# Patient Record
Sex: FEMALE | Race: WHITE | Employment: PART TIME | URBAN - METROPOLITAN AREA
[De-identification: names, ages, dates, MRNs, and addresses within clinical notes are randomized per-mention and may not be internally consistent; named-entity substitution may affect disease eponyms.]

---

## 2017-01-11 ENCOUNTER — HOSPITAL ENCOUNTER (OUTPATIENT)
Dept: MAMMOGRAPHY | Age: 67
Discharge: HOME | End: 2017-01-11
Attending: INTERNAL MEDICINE
Payer: MEDICAID

## 2017-01-11 DIAGNOSIS — Z85.3 HX OF BREAST CANCER: ICD-10-CM

## 2017-01-11 DIAGNOSIS — Z12.31 VISIT FOR SCREENING MAMMOGRAM: ICD-10-CM

## 2017-01-11 PROCEDURE — 77066 DX MAMMO INCL CAD BI: CPT

## 2017-05-10 ENCOUNTER — HOSPITAL ENCOUNTER (OUTPATIENT)
Dept: PHYSICAL THERAPY | Age: 67
Discharge: HOME | End: 2017-05-10
Payer: MEDICAID

## 2017-05-10 VITALS — HEIGHT: 61 IN | BODY MASS INDEX: 33.99 KG/M2 | WEIGHT: 180 LBS

## 2017-05-10 PROCEDURE — 97161 PT EVAL LOW COMPLEX 20 MIN: CPT

## 2017-05-10 PROCEDURE — 97140 MANUAL THERAPY 1/> REGIONS: CPT

## 2017-05-10 NOTE — PROGRESS NOTES
[address] [place] [address]  Physical Therapy Lymphedema Clinic  286 [facility], [address], [facility] 22.    INITIAL EVALUATION    NAME: Yudith Charles AGE: 61 y.o. GENDER: female  DATE: 5/10/2017  REFERRING PHYSICIAN: Dr. Jessica Sales:   Primary Diagnosis:  · L UE lymphedema, secondary (I89.0)  Other Treatment Diagnoses:  · Swelling not relieved by elevation (R60.9)  · Lack of coordination (R27.9)  · Malignant neoplasm of breast with lumpectomy or mastectomy (C50.919)  Date of Onset: 2012  Present Symptoms and Functional Limitations: Pt developed swelling in June 2012 and was seen in our clinic in November 2012. She has been wearing custom flat knit arm sleeves and gloves and has been getting them replaced every 6 months. She reports no change in her medical history. She wears her day garments faithfully even while at work and then at night she dons the solaris ready wrap with hand piece. She prefers the CC1 vs the CC2 as she has less tolerance for the increase pressure. She is here today to secure new garments for the next 6 months. Lymphedema Life Impact Scale: Score of 8 and impairment percentage of 12%. See scanned document. Past Medical History:   Past Medical History:   Diagnosis Date    Breast cancer ([address] 2012    Cancer ([address]     Left breast invasion ductal CA ([place])    Hypertension     S/P radiation therapy 2012     Past Surgical History:   Procedure Laterality Date    BREAST SURGERY PROCEDURE UNLISTED      Left Breast Biopsy on 09/02/11    HX BREAST LUMPECTOMY  4/26/12    LEFT breast lumpectomy with axillary node dissection     HX TUBAL LIGATION      HX VASCULAR ACCESS      portacath  X2     Current Medications:    Current Outpatient Prescriptions   Medication Sig    MULTIVITAMIN PO Take  by mouth daily.  ibuprofen (MOTRIN) 200 mg tablet Take 200 mg by mouth every six (6) hours as needed.     aspirin delayed-release (ASPIR-81) 81 mg tablet Take  by mouth daily. No current facility-administered medications for this encounter. Allergies: No Known Allergies   Prior Level of Function/Social/Work History/Personal factors and/or comorbidities impacting plan of care: Pt continues to work 20-25 hours per week as a . She will be changing to medicare when she turns 67 this year. She continues to have swelling despite being faithful with her day and night compression garments. She continues to have difficulty with the wrist area of arm sleeve. Living Situation: works part time, son lives with her and she is his       Trainable Caregiver?: son if needed   Self-care/ADLs: mod I     Mobility: I   Sleeping Arrangement:  In a bed   Adaptive Equipment Owned: none   Other: n/a  Previous Therapy: In our clinic every 6 months since fall of 2012  Compression/Lymphedema Equipment:  juzo flat knit arm sleeve and extended gloves in CC1, 2 pairs                                                                           Solaris ready wrap sleeve with gauntlet and the farrow microfine glove    SUBJECTIVE:  Pt states that she will switch to medicare come August as she will be turning 67. Patients goals for therapy: \"to get new garments for the next year. \"    OBJECTIVE DATA SUMMARY:   EXAMINATION/PRESENTATION/DECISION MAKING:   Pain:  Pain Scale 1: Numeric (0 - 10)     Pain Intensity 1: 2     Pain Location 1: Arm     Pain Orientation 1: Left, Lower     Patient Stated Pain Goal: 0       Self-Care and ADLs:  Grooming: I Bathing: showers   UB Dressing: I LB Dressing: I   Don/Doff Shoes/Socks:  I Toileting: I   Other: Pt has to limit overhead repetitive lifting at work and home    Skin and Tissue Assessment:  Dermal Status:  (x )  Intact ( )  Dry   ( )  Tenuous ( )  Flaky   ( )  Wound/lesion present ( )  Scars   ( )  Dermatitis    Texture/Consistency:  ( x)  Boggy-L hand and to distal forearm ( )  Pitting Edema ( )  Brawny ( )  Combination   ( )  Fibrotic/Woody    Pigmentation/Color Change:  ( x)  Normal except for a bruise along L medial forearm where she fall going up the steps yesterday while carrying 2 drinks ( )  Hemosiderin   ( )  Red ( )  Erythematous   ( )  Hyperpigmented ( )  Hyperlipodermatosclerosis   Anomalies:  ( )  Lymphorrhea ( )  Vesicles   ( )  Petechiae ( )  Warty Vercusis   ( )  Bullae ( )  Papilloma   Circulatory:  ( )  JESENIA ( )  Varicosities:   ( )  Pulses ( )  Vascular studies ruled out DVT in past   ( )  DVT History    Nails:  ( x)  Normal  ( )  Fungus  Stemmers Sign: positive L dorsal hand  Height:  Height: 5' 1\" (154.9 cm)  Weight:  Weight: 81.6 kg (180 lb)   BMI:  BMI (calculated): 34  (36 or greater: adversely affecting lymphedema)  Wound/Ulcer:  none      Wound Pain:   n/a  Volumetric Measurements: UEs  Right:  2,996 mL (loss of 70 ml since 11/3/16) Left:  3,128 mL (loss of 334 ml since 11/3/16)   % Difference: 4% Dominance: R   (See scanned graph)  Range of Motion: WNL  Strength:  WNL  Sensation:  Intact    Mobility:    Bed/Chair Mobility:  I Transfers: I   Sitting Balance:  good Standing Balance:  good   Gait:  I community ambulation without any assistive device Wheelchair Mobility:  n/a   Endurance:  good Stairs:  Can manage with railing       Safety:  Patient is alert and oriented:  yes   Safety awareness:  good   Fall Risk?:  Yes, yesterday she tripped over her shoes going up the steps and had 2 drinks in her hands and was unable to use the railing as she normally does and she fell, bruise noted on her L forearm but no other injury noted by patient.    Patient given written fall prevention handout: Yes   Precautions:  Standard lymphedema precautions to include avoiding blood pressure readings, injections and IVs or other procedures/acts that could lead to broken skin on affected area, and avoiding excessive heat, resistive activity or altitude without compression garment    Based on the above components, the patient evaluation is determined to be of the following complexity level: LOW     Evaluation Time: 1:30-2 pm  TREATMENT PROVIDED:   1. Treatment description:  Manual Therapy: Patient instructed in skin care principles and anatomy of the lymphatic system. Therapist able to demonstrate manual lymphatic drainage techniques for the drainage of L UE and skin care protocol. Pt reports no issues with her skin during the past 6 months. Pt reports that she has her written exercise routines and she does know how to perform each exercise. She admits to not performing them daily but can verbalize their importance in mobilizing lymph fluid. Pt has been performing self MLD using the MLD packet she received. She has been faithful wearing her day and night compression garments and is in need of replacement ones today. She was measured for both day and night garments. She was tried with the solaris ready to wear flat knit arm sleeve and glove but that are not a good fit. Pt continues to need custom garments due to her size and shape primarily of the L wrist area. Pt would benefit from a night foam hand piece as well and the vasopneumatic device in the home. Will send request for insurance benefits for these items. Treatment time:  2-2:30 pm  Minutes: 30    ASSESSMENT:   Mindi Gimenez is a 61 y.o. female who presents with secondary L UE lymphedema, stage 2 with fibrosis of the dorsal hand and forearm regions. Patient will benefit from complete decongestive therapy (CDT) including manual lymphatic drainage (MLD) technique, short-stretch textile bandages/compression system to decongest limb, and kinesiotaping as appropriate. Patient will receive instruction in proper skin care to recognize signs/symptoms of and prevent infection, therapeutic exercise, and self-MLD for independent home program and restorative lymphatic performance.     This care is medically necessary due to the infection risk with lymphedema and to improve functional activities. CDT is necessary to resolve swelling to allow patient to return to wearing normal clothes and prevent worsening of symptoms, such as infections or hospitalizations. Patient will be independent with home program strategies to allow improved ADL ability and mobility and to allow patient to return to greatest functional independence. Rehabilitation potential is considered to be good. Factors which may influence rehabilitation potential include good compliance with her compression day and night systems for the past 5 years and history of no cellulitis. Patient will benefit from 2-5 physical therapy visits over 12 weeks to optimize improvement in these areas. PLAN OF CARE:   Recommendations and Planned Interventions:  Manual lymph drainage/complete decongestive therapy  Multi-layer compression bandaging (short-stretch)  Compression garment fitting/provision  Lymphedema therapeutic exercise  AROM/PROM/Strength/Coordination  Self-care training  Functional mobility training  Education in skin care and lymphedema precautions  Self-MLD education per home program  Self-bandaging education per home program  Caregiver education as needed  Wound care as needed     GOALS  Short term goals  Time frame: to meet by 6/30/2017  1. Patient will receive 2 sets of custom day and night compression garments for her L UE and the fit will be deemed good for effective and safe use of the products. 2.  Patient will complete a demonstration of the flexitouch vasopneumatic device. 3.  Patient will continue with her home program of phase 2 CDT. Long term goals  Time frame: 8/5/2017  1. Pt will show improvement in Lymphedema Life Impact Scale by decreasing impairment percentage to under 10% and thus allow improvement in patient's quality of life. 2.  Patient will be independent with don/doff of compression system and use in order to prevent reaccumulation of fluid at discharge.   3. Pt will be independent in self-MLD and show stable limb volumes showing decongestion and pt. ready for transition to independent restorative phase of lymphedema therapy. Patient has participated in goal setting and agrees to work toward plan of care. Patient was instructed to call if questions or concerns arise. Thank you for this referral.  Kelle Bosworth, PT   Time Calculation: 60 mins    TREATMENT PLAN EFFECTIVE DATES:   5/10/2017 TO 8/5/2017  I have read the above plan of care for Rolando Cooper. I certify the above prescribed services are required by this patient and are medically necessary.   The above plan of care has been developed in conjunction with the lymphedema/physical therapist.       Physician Signature: ____________________________________Date:______________                                              Dr. Luda Srivastava

## 2017-05-15 ENCOUNTER — OFFICE VISIT (OUTPATIENT)
Dept: ONCOLOGY | Age: 67
End: 2017-05-15

## 2017-05-15 VITALS
DIASTOLIC BLOOD PRESSURE: 84 MMHG | HEIGHT: 61 IN | SYSTOLIC BLOOD PRESSURE: 174 MMHG | HEART RATE: 91 BPM | RESPIRATION RATE: 16 BRPM | WEIGHT: 185.2 LBS | OXYGEN SATURATION: 99 % | TEMPERATURE: 97.7 F | BODY MASS INDEX: 34.97 KG/M2

## 2017-05-15 DIAGNOSIS — I10 ESSENTIAL HYPERTENSION: ICD-10-CM

## 2017-05-15 DIAGNOSIS — I89.0 LYMPHEDEMA OF ARM: ICD-10-CM

## 2017-05-15 DIAGNOSIS — C50.912 BREAST CANCER, STAGE 2, LEFT (HCC): Primary | ICD-10-CM

## 2017-05-15 DIAGNOSIS — Z98.890 S/P BREAST LUMPECTOMY: ICD-10-CM

## 2017-05-15 NOTE — PROGRESS NOTES
Mili Neil is a 61 y.o. 1950 female and presents with Breast Cancer    CC breast cancer dx  9/11    STAGE:  Initially T2N1 triple neg    T1CN1a post  Chemo   Er-pr- her2 amplified    TREATMENT COURSE: neoadj TAC x 6  Then lump  Herceptin. XRT. INTERIM HISTORY: Pt is here for f/u triple neg breast cancer 12 months. mammo good 1/17. No new issues. Has medicare and wants medicaid and will ask . Brought in paperwork for us today. Saw surgery 7/16. Labs per PCP. Feels good. Past Medical History:   Diagnosis Date    [facility] 2012    Cancer [facility])     Left breast invasion ductal CA ([place])    Hypertension     S/P radiation therapy 2012     Past Surgical History:   Procedure Laterality Date    BREAST SURGERY PROCEDURE UNLISTED      Left Breast Biopsy on 09/02/11    HX BREAST LUMPECTOMY  4/26/12    LEFT breast lumpectomy with axillary node dissection     HX TUBAL LIGATION      HX VASCULAR ACCESS      portacath  X2     Social History     Social History    Marital status:      Spouse name: N/A    Number of children: N/A    Years of education: N/A     Social History Main Topics    Smoking status: Never Smoker    Smokeless tobacco: Never Used    Alcohol use Yes      Comment: rare    Drug use: Yes     Special: Prescription, OTC    Sexual activity: Not Asked     Other Topics Concern    None     Social History Narrative     Family History   Problem Relation Age of Onset    Breast Cancer Mother 48    Cancer Mother 48     breast    Heart Disease Father      MI    Diabetes Father     Diabetes Paternal Aunt     Diabetes Paternal Aunt        Current Outpatient Prescriptions   Medication Sig Dispense Refill    MULTIVITAMIN PO Take  by mouth daily.  aspirin delayed-release (ASPIR-81) 81 mg tablet Take  by mouth daily.  ibuprofen (MOTRIN) 200 mg tablet Take 200 mg by mouth every six (6) hours as needed.          No Known Allergies    Review of Systems    A comprehensive review of systems was negative except for: per HPI     Objective:  Visit Vitals    /84    Pulse 91    Temp 97.7 °F (36.5 °C) (Oral)    Resp 16    Ht 5' 1\" (1.549 m)    Wt 185 lb 3.2 oz (84 kg)    SpO2 99%    BMI 34.99 kg/m2         Physical Exam:   General appearance - alert, well appearing, and in no distress, oriented to person, place, and time and overweight  Mental status - alert, oriented to person, place, and time, normal mood, behavior, speech, dress, motor activity, and thought processes  EYE-conj clear  Mouth - mucous membranes moist, pharynx normal without lesions  Neck - supple, no cervical or supraclavicular adenopathy appreciated  Chest - clear to auscultation bilaterally  Heart - regular rate, no M/R/G  Abdomen - round, soft, non-tender. Non-distended. Bowel sounds present. Ext-left arm lymphedema in hand and forearm with wrist wrap. Edema present bilateral ankles/feet. 1+ pedal edema on the left, trace on the right (reports this is her baseline). 2+pedal pulses bilaterally. Skin-Warm and dry. Pink/scaly ~1cm lesion noted on the outer aspect of the right breast, scabbed over in the center  Neuro -alert, oriented, normal speech, no focal findings or movement disorder noted  Breast - no masses palpated bilateral breasts    Diagnostic Imaging   reviewed  [facility] Results (most recent):    Results from Hospital Encounter encounter on 01/11/17   [facility] MAMMO BI DX INCL CAD   Narrative STUDY:  Bilateral Digital diagnostic Mammogram    INDICATION: Prior left breast cancer, left segmental mastectomy and axillary  node dissection, April 2012. Direct comparison is made to prior mammograms dated January 2016, January 2015,  2014, 2013, 2011. BREAST COMPOSITION: There are scattered fibroglandular densities (approximately  25-50% glandular).     FINDINGS: Bilateral digital diagnostic mammography was performed, and is  interpreted in conjunction with a computer assisted detection (CAD) system. The  breast parenchyma is stable bilaterally. No new suspicious masses or  calcifications are identified. There is no skin thickening or nipple retraction. There has been no significant change. Impression IMPRESSION: BI-RADS 2. Benign. No mammographic evidence of malignancy. Recommend  annual bilateral mammography, unless earlier clinical indication. Findings were  discussed with the patient at the time of interpretation. Lab Results    reviewed  Lab Results   Component Value Date/Time    WBC 4.5 01/07/2016 03:37 PM    HGB 12.7 01/07/2016 03:37 PM    HCT 38.4 01/07/2016 03:37 PM    PLATELET 592 43/49/5335 03:37 PM    MCV 83 01/07/2016 03:37 PM       Lab Results   Component Value Date/Time    Sodium 144 01/07/2016 03:37 PM    Potassium 5.2 01/07/2016 03:37 PM    Chloride 101 01/07/2016 03:37 PM    CO2 27 01/07/2016 03:37 PM    Anion gap 9 06/04/2013 11:17 AM    Glucose 92 01/07/2016 03:37 PM    BUN 14 01/07/2016 03:37 PM    Creatinine 0.79 01/07/2016 03:37 PM    BUN/Creatinine ratio 18 01/07/2016 03:37 PM    GFR est AA 92 01/07/2016 03:37 PM    GFR est non-AA 80 01/07/2016 03:37 PM    Calcium 9.2 01/07/2016 03:37 PM    AST (SGOT) 17 01/07/2016 03:37 PM    Alk. phosphatase 136 01/07/2016 03:37 PM    Protein, total 7.0 01/07/2016 03:37 PM    Albumin 4.3 01/07/2016 03:37 PM    Globulin 3.6 06/04/2013 11:17 AM    A-G Ratio 1.6 01/07/2016 03:37 PM    ALT (SGPT) 8 01/07/2016 03:37 PM       .    Assessment/Plan:     Emmy Melvin is a 61 y.o. female with left Breast Cancer dx 9/11    STAGE:  Initially T2N1 triple neg    T1CN1a post  Lump. Er-pr- her2 amplified    TREATMENT COURSE: neoadj TAC x 6   Lump/XRT. herceptin. 1.  Stage 2 triple neg Breast cancer s/p lump/ XRT and s/p adjuvant chemo with TAC. Then herceptin. NERD. Pt is not on any therapy from here. Last mammogram January 2017 good. Labs per PCP. 2.  Lymphedema left arm and breast.   Chronic. She continues to use the arm wrap. 3.  HTN. Per PCP      Follow-up in 1 year, sooner if needed. ICD-10-CM ICD-9-CM    1. Breast cancer, stage 2, left C50.912 174.9    2. S/P breast lumpectomy Z98.890 V45.89    3. Essential hypertension I10 401.9    4. Lymphedema of arm I89.0 457.1      No orders of the defined types were placed in this encounter. call if any problems  There are no Patient Instructions on file for this visit. Follow-up Disposition:  Return in about 1 year (around 5/15/2018).     Allyson De La Cruz, DO

## 2017-05-15 NOTE — MR AVS SNAPSHOT
Visit Information Date & Time Provider Department Dept. Phone Encounter #  
 5/15/2017  9:30 AM Jael Vega, [place] [address] [place] [address] Oncology at [facility] [id number] Follow-up Instructions Return in about 1 year (around 5/15/2018). Routing History Follow-up and Disposition History Upcoming Health Maintenance Date Due Hepatitis C Screening 1950 Pneumococcal 19-64 Highest Risk (1 of 3 - PCV13) 8/28/1971 DTaP/Tdap/Td series (1 - Tdap) 8/28/1973 FOBT Q 1 YEAR AGE 50-75 8/28/2002 ZOSTER VACCINE AGE 60> 8/28/2012 PAP AKA CERVICAL CYTOLOGY 8/24/2014 INFLUENZA AGE 9 TO ADULT 8/1/2017 BREAST CANCER SCRN MAMMOGRAM 1/11/2019 Allergies as of 5/15/2017  Review Complete On: 5/15/2017 By: Jael Vega, DO No Known Allergies Current Immunizations  Reviewed on 5/15/2017 Name Date Influenza Vaccine Whole 10/22/2011 Reviewed by Praful Yarbrough LPN on 8/60/9479 at  9:36 AM  
You Were Diagnosed With   
  
 Codes Comments Breast cancer, stage 2, left    -  Primary ICD-10-CM: L57.661 ICD-9-CM: 174.9 S/P breast lumpectomy     ICD-10-CM: P01.642 ICD-9-CM: V45.89 Essential hypertension     ICD-10-CM: I10 
ICD-9-CM: 401.9 Lymphedema of arm     ICD-10-CM: I89.0 ICD-9-CM: 186.6 Vitals BP Pulse Temp Resp Height(growth percentile) Weight(growth percentile) 174/84 91 97.7 °F (36.5 °C) (Oral) 16 5' 1\" (1.549 m) 185 lb 3.2 oz (84 kg) SpO2 BMI OB Status Smoking Status 99% 34.99 kg/m2 Postmenopausal Never Smoker Vitals History BMI and BSA Data Body Mass Index Body Surface Area 34.99 kg/m 2 1.9 m 2 Preferred Pharmacy Pharmacy Name Phone [facility] [address] [place] [address] [phone] Your Updated Medication List  
  
   
This list is accurate as of: 5/15/17  9:58 AM.  Always use your most recent med list.  
  
  
  
  
 ASPIR-81 81 mg tablet Generic drug:  aspirin delayed-release Take  by mouth daily. ibuprofen 200 mg tablet Commonly known as:  MOTRIN Take 200 mg by mouth every six (6) hours as needed. MULTIVITAMIN PO Take  by mouth daily. Follow-up Instructions Return in about 1 year (around 5/15/2018). Introducing [facility]! [facility] introduces YourTeamOnline patient portal. Now you can access parts of your medical record, email your doctor's office, and request medication refills online. 1. In your internet browser, go to [email or web address] 2. Click on the First Time User? Click Here link in the Sign In box. You will see the New Member Sign Up page. 3. Enter your [employer] Access Code exactly as it appears below. You will not need to use this code after youve completed the sign-up process. If you do not sign up before the expiration date, you must request a new code. · YourTeam[employer] Access Code: [email or web address] Expires: 8/8/2017  1:26 PM 
 
4. Enter the last four digits of your Social Security Number (xxxx) and Date of Birth (mm/dd/yyyy) as indicated and click Submit. You will be taken to the next sign-up page. 5. Create a Your[employer] ID. This will be your Your[employer] login ID and cannot be changed, so think of one that is secure and easy to remember. 6. Create a [employer] password. You can change your password at any time. 7. Enter your Password Reset Question and Answer. This can be used at a later time if you forget your password. 8. Enter your e-mail address. You will receive e-mail notification when new information is available in [address] 9. Click Sign Up. You can now view and download portions of your medical record. 10. Click the Download Summary menu link to download a portable copy of your medical information.  
 
If you have questions, please visit the Frequently Asked Questions section of the [facility]. Remember, [employer] is NOT to be used for urgent needs. For medical emergencies, dial 911. Now available from your iPhone and Android! Please provide this summary of care documentation to your next provider. Your primary care clinician is listed as Emily Kim. If you have any questions after today's visit, please call [phone].

## 2017-06-29 NOTE — PROGRESS NOTES
Lymphedema Discharge Note  Pt called back to say that she received her custom garments and that they are fitting fine. She will need to be scheduled for a 6 month re-evaluation and be discharged from the program at this time. Pt was last seen in the clinic on 5/10/2017.

## 2017-07-13 ENCOUNTER — DOCUMENTATION ONLY (OUTPATIENT)
Dept: ONCOLOGY | Age: 67
End: 2017-07-13

## 2017-07-13 NOTE — PROGRESS NOTES
Order faxed complete to [facility] and to [facility] for scanning.

## 2017-07-21 ENCOUNTER — OFFICE VISIT (OUTPATIENT)
Dept: SURGERY | Age: 67
End: 2017-07-21

## 2017-07-21 VITALS
DIASTOLIC BLOOD PRESSURE: 66 MMHG | BODY MASS INDEX: 34.93 KG/M2 | SYSTOLIC BLOOD PRESSURE: 179 MMHG | WEIGHT: 185 LBS | HEIGHT: 61 IN | HEART RATE: 77 BPM

## 2017-07-21 DIAGNOSIS — C50.412 MALIGNANT NEOPLASM OF UPPER-OUTER QUADRANT OF LEFT FEMALE BREAST (HCC): Primary | ICD-10-CM

## 2017-07-21 NOTE — PROGRESS NOTES
HISTORY OF PRESENT ILLNESS  Cody Thakur is a 61 y.o. female. HPI ESTABLISHED patient here for annual follow up for LEFT breast cancer. Patient is doing well. Denies pain. No breast, skin, or nipple changes.       S/P neoadjuvant TACx6, LEFT lumpectomy in 4/26/12 followed by Herceptin    Mammogram done, 1/2017, BIRADS 2  ROS    Physical Exam    ASSESSMENT and PLAN  {ASSESSMENT/PLAN:[id number]}

## 2017-07-21 NOTE — PROGRESS NOTES
HISTORY OF PRESENT ILLNESS  Orin Singh is a 61 y.o. female. HPI  ESTABLISHED patient here for annual follow up for LEFT breast cancer. Patient is doing well. Denies pain. No breast, skin, or nipple changes.       S/P neoadjuvant TACx6, LEFT lumpectomy in 12 followed by Herceptin     Mammogram done, 2017, BIRADS 2    Past Medical History:   Diagnosis Date    [facility] ([facility]     Cancer ([facility]     Left breast invasion ductal CA ([facility])    Hypertension     S/P radiation therapy        Past Surgical History:   Procedure Laterality Date    BREAST SURGERY PROCEDURE UNLISTED      Left Breast Biopsy on 11    HX BREAST LUMPECTOMY  12    LEFT breast lumpectomy with axillary node dissection     HX TUBAL LIGATION      HX VASCULAR ACCESS      portacath  X2       Social History     Social History    Marital status:      Spouse name: N/A    Number of children: N/A    Years of education: N/A     Occupational History    Not on file. Social History Main Topics    Smoking status: Never Smoker    Smokeless tobacco: Never Used    Alcohol use Yes      Comment: rare    Drug use: Yes     Special: Prescription, OTC    Sexual activity: Not on file     Other Topics Concern    Not on file     Social History Narrative       Current Outpatient Prescriptions on File Prior to Visit   Medication Sig Dispense Refill    MULTIVITAMIN PO Take  by mouth daily.  ibuprofen (MOTRIN) 200 mg tablet Take 200 mg by mouth every six (6) hours as needed.  aspirin delayed-release (ASPIR-81) 81 mg tablet Take  by mouth daily. No current facility-administered medications on file prior to visit. No Known Allergies    OB History      Para Term  AB Living    2 1   1     SAB TAB Ectopic Molar Multiple Live Births    1             Obstetric Comments    Menarche:  17. LMP: 38.  # of Children:  1.   Age at Delivery of First Child:  34.   Hysterectomy/oophorectomy: NO/NO.  Breast Bx:  no.  Hx of Breast Feeding:  no. BCP:  yes. Hormone therapy:  yes. ROS  Constitutional: Negative    HENT: Negative. Eyes: Negative. Respiratory: Negative. Cardiovascular: Negative. Gastrointestinal: Negative. Genitourinary: Negative. Musculoskeletal: Negative. Skin: Negative. Neurological: Negative. Endo/Heme/Allergies: Negative. Psychiatric/Behavioral: Negative. Physical Exam   Cardiovascular: Normal rate and normal heart sounds. Pulmonary/Chest: Breath sounds normal. Right breast exhibits no inverted nipple, no mass, no nipple discharge, no skin change and no tenderness. Left breast exhibits no inverted nipple, no mass, no nipple discharge, no skin change and no tenderness. Breasts are symmetrical.       Lymphadenopathy:        Right cervical: No superficial cervical, no deep cervical and no posterior cervical adenopathy present. Left cervical: No superficial cervical, no deep cervical and no posterior cervical adenopathy present. Right axillary: No pectoral and no lateral adenopathy present. Left axillary: No pectoral and no lateral adenopathy present. ASSESSMENT and PLAN    ICD-10-CM ICD-9-CM    1. Malignant neoplasm of upper-outer quadrant of left female breast (HCC) C50.412 174.4      Pt here to f/u on LT breast cancer and doing well. MICHELLE for 5 years. Last mammo looks good. Repeat mammo and f/u prn. This plan was reviewed with the patient and patient agrees. All questions were answered.     Written by Katty Seaman, as dictated by Dr. Ralph Lau MD.

## 2017-07-21 NOTE — PATIENT INSTRUCTIONS
Mammogram: About This Test  What is it? A mammogram is an X-ray of the breast that is used to screen for breast cancer. This test can find tumors that are too small for you or your doctor to feel. Cancer is most easily treated and cured when it is found at an early stage. Why is this test done? A mammogram is done to:  · Look for breast cancer in women who don't have symptoms. · Find breast cancer in women who have symptoms. Symptoms of breast cancer may include a lump or thickening in the breast, nipple discharge, or dimpling of the skin on one area of the breast.  · Find an area of suspicious breast tissue to remove for an exam under a microscope (biopsy). How can you prepare for the test?  · Tell your doctor if you:  ¨ Are or might be pregnant. ¨ Are breastfeeding. ¨ Have breast implants. ¨ Have previously had a breast biopsy. · On the day of the test, don't use any deodorant, perfume, powders, or ointments. What happens before the test?  · You will need to take off any jewelry that might interfere with the X-ray pictures. · You will need to take off your clothes above the waist.  · You will be given a cloth or paper gown to use during the test.  What happens during the test?  · You usually stand during a mammogram.  · One at a time, your breasts will be placed on a flat plate that contains the X-ray film. · Another plate is then pressed firmly against your breast to help flatten out the breast tissue. You may be asked to lift your arm. · For a few seconds while the X-ray picture is being taken, you will need to hold your breath. · At least two pictures are taken of each breast. One is taken from the top and one from the side. What else should you know about the test?  · The X-ray plate will feel cold when you place your breast on it. Having your breasts flattened and squeezed isn't comfortable. But it is necessary to flatten out the breast tissue to get the best pictures.   · Mammograms do not prevent breast cancer or reduce a woman's risk of developing cancer. · Most things that are found during a mammogram are not breast cancer. How long does the test take? · The test will take about 10 to 15 minutes. You may be in the clinic for up to an hour. What happens after the test?  · You will probably be able to go home right away. · You can go back to your usual activities right away. Follow-up care is a key part of your treatment and safety. Be sure to make and go to all appointments, and call your doctor if you are having problems. It's also a good idea to keep a list of the medicines you take. Ask your doctor when you can expect to have your test results. Where can you learn more? Go to http://scooter-felicity.info/. Enter X000 in the search box to learn more about \"Mammogram: About This Test.\"  Current as of: July 26, 2016  Content Version: 11.3  © 9117-8485 [employer]. Care instructions adapted under license by [employer] (which disclaims liability or warranty for this information). If you have questions about a medical condition or this instruction, always ask your healthcare professional. [address] any warranty or liability for your use of this information.

## 2017-07-21 NOTE — COMMUNICATION BODY
HISTORY OF PRESENT ILLNESS  Imtiaz Parra is a 61 y.o. female. HPI  ESTABLISHED patient here for annual follow up for LEFT breast cancer. Patient is doing well. Denies pain. No breast, skin, or nipple changes.       S/P neoadjuvant TACx6, LEFT lumpectomy in 12 followed by Herceptin     Mammogram done, 2017, BIRADS 2    Past Medical History:   Diagnosis Date    [facility]     Cancer ([facility]     Left breast invasion ductal CA ([facility])    Hypertension     S/P radiation therapy        Past Surgical History:   Procedure Laterality Date    BREAST SURGERY PROCEDURE UNLISTED      Left Breast Biopsy on 11    HX BREAST LUMPECTOMY  12    LEFT breast lumpectomy with axillary node dissection     HX TUBAL LIGATION      HX VASCULAR ACCESS      portacath  X2       Social History     Social History    Marital status:      Spouse name: N/A    Number of children: N/A    Years of education: N/A     Occupational History    Not on file. Social History Main Topics    Smoking status: Never Smoker    Smokeless tobacco: Never Used    Alcohol use Yes      Comment: rare    Drug use: Yes     Special: Prescription, OTC    Sexual activity: Not on file     Other Topics Concern    Not on file     Social History Narrative       Current Outpatient Prescriptions on File Prior to Visit   Medication Sig Dispense Refill    MULTIVITAMIN PO Take  by mouth daily.  ibuprofen (MOTRIN) 200 mg tablet Take 200 mg by mouth every six (6) hours as needed.  aspirin delayed-release (ASPIR-81) 81 mg tablet Take  by mouth daily. No current facility-administered medications on file prior to visit. No Known Allergies    OB History      Para Term  AB Living    2 1   1     SAB TAB Ectopic Molar Multiple Live Births    1             Obstetric Comments    Menarche:  17. LMP: 38.  # of Children:  1.   Age at Delivery of First Child:  34.   Hysterectomy/oophorectomy: NO/NO.  Breast Bx:  no.  Hx of Breast Feeding:  no. BCP:  yes. Hormone therapy:  yes. ROS  Constitutional: Negative    HENT: Negative. Eyes: Negative. Respiratory: Negative. Cardiovascular: Negative. Gastrointestinal: Negative. Genitourinary: Negative. Musculoskeletal: Negative. Skin: Negative. Neurological: Negative. Endo/Heme/Allergies: Negative. Psychiatric/Behavioral: Negative. Physical Exam   Cardiovascular: Normal rate and normal heart sounds. Pulmonary/Chest: Breath sounds normal. Right breast exhibits no inverted nipple, no mass, no nipple discharge, no skin change and no tenderness. Left breast exhibits no inverted nipple, no mass, no nipple discharge, no skin change and no tenderness. Breasts are symmetrical.       Lymphadenopathy:        Right cervical: No superficial cervical, no deep cervical and no posterior cervical adenopathy present. Left cervical: No superficial cervical, no deep cervical and no posterior cervical adenopathy present. Right axillary: No pectoral and no lateral adenopathy present. Left axillary: No pectoral and no lateral adenopathy present. ASSESSMENT and PLAN    ICD-10-CM ICD-9-CM    1. Malignant neoplasm of upper-outer quadrant of left female breast (HCC) C50.412 174.4      Pt here to f/u on LT breast cancer and doing well. MICHELLE for 5 years. Last mammo looks good. Repeat mammo and f/u prn. This plan was reviewed with the patient and patient agrees. All questions were answered.     Written by Milad Gomez, as dictated by Dr. Mecca Cardona MD.

## 2017-07-21 NOTE — MR AVS SNAPSHOT
Visit Information Date & Time Provider Department Dept. Phone Encounter #  
 7/21/2017  8:28 AM Julia Mann MD [address] at [address] [place] [address] [id number] Upcoming Health Maintenance Date Due Hepatitis C Screening 1950 Pneumococcal 19-64 Highest Risk (1 of 3 - PCV13) 8/28/1971 DTaP/Tdap/Td series (1 - Tdap) 8/28/1973 FOBT Q 1 YEAR AGE 50-75 8/28/2002 ZOSTER VACCINE AGE 60> 6/28/2012 PAP AKA CERVICAL CYTOLOGY 8/24/2014 INFLUENZA AGE 9 TO ADULT 8/1/2017 BREAST CANCER SCRN MAMMOGRAM 1/11/2019 Allergies as of 7/21/2017  Review Complete On: 7/21/2017 By: Lawrence Gama RN No Known Allergies Current Immunizations  Reviewed on 5/15/2017 Name Date Influenza Vaccine Whole 10/22/2011 Not reviewed this visit You Were Diagnosed With   
  
 Codes Comments Malignant neoplasm of upper-outer quadrant of left female breast ([facility]    -  Primary ICD-10-CM: V46.757 ICD-9-CM: 174.4 Vitals BP Pulse Height(growth percentile) Weight(growth percentile) BMI OB Status 179/66 77 5' 1\" (1.549 m) 185 lb (83.9 kg) 34.96 kg/m2 Postmenopausal  
 Smoking Status Never Smoker Vitals History BMI and BSA Data Body Mass Index Body Surface Area 34.96 kg/m 2 1.9 m 2 Preferred Pharmacy Pharmacy Name Phone [facility] [address] [place] [address] [phone] Your Updated Medication List  
  
   
This list is accurate as of: 7/21/17  8:52 AM.  Always use your most recent med list.  
  
  
  
  
 ASPIR-81 81 mg tablet Generic drug:  aspirin delayed-release Take  by mouth daily. ibuprofen 200 mg tablet Commonly known as:  MOTRIN Take 200 mg by mouth every six (6) hours as needed. MULTIVITAMIN PO Take  by mouth daily. Patient Instructions Mammogram: About This Test 
What is it? A mammogram is an X-ray of the breast that is used to screen for breast cancer. This test can find tumors that are too small for you or your doctor to feel. Cancer is most easily treated and cured when it is found at an early stage. Why is this test done? A mammogram is done to: 
· Look for breast cancer in women who don't have symptoms. · Find breast cancer in women who have symptoms. Symptoms of breast cancer may include a lump or thickening in the breast, nipple discharge, or dimpling of the skin on one area of the breast. 
· Find an area of suspicious breast tissue to remove for an exam under a microscope (biopsy). How can you prepare for the test? 
· Tell your doctor if you: ¨ Are or might be pregnant. ¨ Are breastfeeding. ¨ Have breast implants. ¨ Have previously had a breast biopsy. · On the day of the test, don't use any deodorant, perfume, powders, or ointments. What happens before the test? 
· You will need to take off any jewelry that might interfere with the X-ray pictures. · You will need to take off your clothes above the waist. 
· You will be given a cloth or paper gown to use during the test. 
What happens during the test? 
· You usually stand during a mammogram. 
· One at a time, your breasts will be placed on a flat plate that contains the X-ray film. · Another plate is then pressed firmly against your breast to help flatten out the breast tissue. You may be asked to lift your arm. · For a few seconds while the X-ray picture is being taken, you will need to hold your breath. · At least two pictures are taken of each breast. One is taken from the top and one from the side. What else should you know about the test? 
· The X-ray plate will feel cold when you place your breast on it. Having your breasts flattened and squeezed isn't comfortable. But it is necessary to flatten out the breast tissue to get the best pictures. · Mammograms do not prevent breast cancer or reduce a woman's risk of developing cancer. · Most things that are found during a mammogram are not breast cancer. How long does the test take? · The test will take about 10 to 15 minutes. You may be in the clinic for up to an hour. What happens after the test? 
· You will probably be able to go home right away. · You can go back to your usual activities right away. Follow-up care is a key part of your treatment and safety. Be sure to make and go to all appointments, and call your doctor if you are having problems. It's also a good idea to keep a list of the medicines you take. Ask your doctor when you can expect to have your test results. Where can you learn more? Go to http://scooter-felicity.info/. Enter J319 in the search box to learn more about \"Mammogram: About This Test.\" Current as of: July 26, 2016 Content Version: 11.3 © 3169-4878 [employer]. Care instructions adapted under license by [employer] (which disclaims liability or warranty for this information). If you have questions about a medical condition or this instruction, always ask your healthcare professional. [address] any warranty or liability for your use of this information. Introducing [facility]! Clair Lynch introduces [employer] Environment patient portal. Now you can access parts of your medical record, email your doctor's office, and request medication refills online. 1. In your internet browser, go to [email or web address] 2. Click on the First Time User? Click Here link in the Sign In box. You will see the New Member Sign Up page. 3. Enter your [employer] Access Code exactly as it appears below. You will not need to use this code after youve completed the sign-up process. If you do not sign up before the expiration date, you must request a new code.  
 
· [employer] Access Code: [email or web address] 
 Expires: 8/8/2017  1:26 PM 
 
4. Enter the last four digits of your Social Security Number (xxxx) and Date of Birth (mm/dd/yyyy) as indicated and click Submit. You will be taken to the next sign-up page. 5. Create a [employer] ID. This will be your [employer] login ID and cannot be changed, so think of one that is secure and easy to remember. 6. Create a [employer] password. You can change your password at any time. 7. Enter your Password Reset Question and Answer. This can be used at a later time if you forget your password. 8. Enter your e-mail address. You will receive e-mail notification when new information is available in [address] 9. Click Sign Up. You can now view and download portions of your medical record. 10. Click the Download Summary menu link to download a portable copy of your medical information. If you have questions, please visit the Frequently Asked Questions section of the [employer] website. Remember, [employer] is NOT to be used for urgent needs. For medical emergencies, dial 911. Now available from your iPhone and Android! Please provide this summary of care documentation to your next provider. Your primary care clinician is listed as Emily Kim. If you have any questions after today's visit, please call [phone].

## 2018-02-08 ENCOUNTER — HOSPITAL ENCOUNTER (OUTPATIENT)
Dept: MAMMOGRAPHY | Age: 68
Discharge: HOME | End: 2018-02-08
Attending: INTERNAL MEDICINE
Payer: MEDICARE

## 2018-02-08 DIAGNOSIS — Z12.39 SCREENING BREAST EXAMINATION: ICD-10-CM

## 2018-02-08 DIAGNOSIS — Z85.3 PERSONAL HISTORY OF MALIGNANT NEOPLASM OF BREAST: ICD-10-CM

## 2018-02-08 PROCEDURE — 77066 DX MAMMO INCL CAD BI: CPT

## 2018-05-02 ENCOUNTER — OFFICE VISIT (OUTPATIENT)
Dept: INTERNAL MEDICINE CLINIC | Age: 68
End: 2018-05-02

## 2018-05-02 VITALS
DIASTOLIC BLOOD PRESSURE: 93 MMHG | SYSTOLIC BLOOD PRESSURE: 184 MMHG | BODY MASS INDEX: 34.48 KG/M2 | RESPIRATION RATE: 18 BRPM | OXYGEN SATURATION: 98 % | TEMPERATURE: 97.9 F | HEIGHT: 61 IN | WEIGHT: 182.6 LBS | HEART RATE: 84 BPM

## 2018-05-02 DIAGNOSIS — C50.912 MALIGNANT NEOPLASM OF LEFT BREAST, STAGE 2 (HCC): ICD-10-CM

## 2018-05-02 DIAGNOSIS — Z13.21 ENCOUNTER FOR VITAMIN DEFICIENCY SCREENING: ICD-10-CM

## 2018-05-02 DIAGNOSIS — Z11.59 ENCOUNTER FOR HEPATITIS C SCREENING TEST FOR LOW RISK PATIENT: ICD-10-CM

## 2018-05-02 DIAGNOSIS — I10 ESSENTIAL HYPERTENSION: Primary | ICD-10-CM

## 2018-05-02 DIAGNOSIS — I89.0 LYMPHEDEMA: ICD-10-CM

## 2018-05-02 RX ORDER — KETOROLAC TROMETHAMINE 5 MG/ML
SOLUTION OPHTHALMIC
COMMUNITY
Start: 2018-04-22 | End: 2019-07-24 | Stop reason: ALTCHOICE

## 2018-05-02 RX ORDER — LISINOPRIL AND HYDROCHLOROTHIAZIDE 10; 12.5 MG/1; MG/1
1 TABLET ORAL DAILY
Qty: 30 TAB | Refills: 1 | Status: SHIPPED | OUTPATIENT
Start: 2018-05-02 | End: 2018-06-14 | Stop reason: SDUPTHER

## 2018-05-02 RX ORDER — OFLOXACIN 3 MG/ML
SOLUTION/ DROPS OPHTHALMIC
COMMUNITY
Start: 2018-04-22 | End: 2019-07-24 | Stop reason: ALTCHOICE

## 2018-05-02 RX ORDER — PREDNISOLONE ACETATE 10 MG/ML
SUSPENSION/ DROPS OPHTHALMIC
COMMUNITY
Start: 2018-04-22 | End: 2019-07-24 | Stop reason: ALTCHOICE

## 2018-05-02 NOTE — PATIENT INSTRUCTIONS
Acute High Blood Pressure: Care Instructions  Your Care Instructions    Acute high blood pressure is very high blood pressure. It's a serious problem. Very high blood pressure can damage your brain, heart, eyes, and kidneys. You may have been given medicines to lower your blood pressure. You may have gotten them as pills or through a needle in one of your veins. This is called an IV. And maybe you were given other medicines too. These can be needed when high blood pressure causes other problems. To keep your blood pressure at a lower level, you may need to make healthy lifestyle changes. And you will probably need to take medicines. Be sure to follow up with your doctor about your blood pressure and what you can do about it. Follow-up care is a key part of your treatment and safety. Be sure to make and go to all appointments, and call your doctor if you are having problems. It's also a good idea to know your test results and keep a list of the medicines you take. How can you care for yourself at home? · See your doctor as often as he or she recommends. This is to make sure your blood pressure is under control. You will probably go at least 2 times a year. But it may be more often at first.  · Take your blood pressure medicine exactly as prescribed. You may take one or more types. They include diuretics, beta-blockers, ACE inhibitors, calcium channel blockers, and angiotensin II receptor blockers. Call your doctor if you think you are having a problem with your medicine. · If you take blood pressure medicine, talk to your doctor before you take decongestants or anti-inflammatory medicine, such as ibuprofen. These can raise blood pressure. · Learn how to check your blood pressure at home. Check it often. · Ask your doctor if it's okay to drink alcohol. · Talk to your doctor about lifestyle changes that can help blood pressure. These include being active and not smoking.   When should you call for help?  Call 911 anytime you think you may need emergency care. This may mean having symptoms that suggest that your blood pressure is causing a serious heart or blood vessel problem. Your blood pressure may be over 180/110. ? For example, call 911 if:  ? · You have symptoms of a heart attack. These may include:  ¨ Chest pain or pressure, or a strange feeling in the chest.  ¨ Sweating. ¨ Shortness of breath. ¨ Nausea or vomiting. ¨ Pain, pressure, or a strange feeling in the back, neck, jaw, or upper belly or in one or both shoulders or arms. ¨ Lightheadedness or sudden weakness. ¨ A fast or irregular heartbeat. ? · You have symptoms of a stroke. These may include:  ¨ Sudden numbness, tingling, weakness, or loss of movement in your face, arm, or leg, especially on only one side of your body. ¨ Sudden vision changes. ¨ Sudden trouble speaking. ¨ Sudden confusion or trouble understanding simple statements. ¨ Sudden problems with walking or balance. ¨ A sudden, severe headache that is different from past headaches. ? · You have severe back or belly pain. ?Do not wait until your blood pressure comes down on its own. Get help right away. ?Call your doctor now or seek immediate care if:  ? · Your blood pressure is much higher than normal (such as 180/110 or higher), but you don't have symptoms. ? · You think high blood pressure is causing symptoms, such as:  ¨ Severe headache. ¨ Blurry vision. ? Watch closely for changes in your health, and be sure to contact your doctor if:  ? · Your blood pressure measures 140/90 or higher at least 2 times. That means the top number is 140 or higher or the bottom number is 90 or higher, or both. ? · You think you may be having side effects from your blood pressure medicine. ? · Your blood pressure is usually normal, but it goes above normal at least 2 times. Where can you learn more? Go to http://scooter-felicity.info/.   Enter P815 in the search box to learn more about \"Acute High Blood Pressure: Care Instructions. \"  Current as of: September 21, 2016  Content Version: 11.4  © 1590-7734 [employer]. Care instructions adapted under license by [employer] (which disclaims liability or warranty for this information). If you have questions about a medical condition or this instruction, always ask your healthcare professional. [address] any warranty or liability for your use of this information. DASH Diet: Care Instructions  Your Care Instructions    The DASH diet is an eating plan that can help lower your blood pressure. DASH stands for Dietary Approaches to Stop Hypertension. Hypertension is high blood pressure. The DASH diet focuses on eating foods that are high in calcium, potassium, and magnesium. These nutrients can lower blood pressure. The foods that are highest in these nutrients are fruits, vegetables, low-fat dairy products, nuts, seeds, and legumes. But taking calcium, potassium, and magnesium supplements instead of eating foods that are high in those nutrients does not have the same effect. The DASH diet also includes whole grains, fish, and poultry. The DASH diet is one of several lifestyle changes your doctor may recommend to lower your high blood pressure. Your doctor may also want you to decrease the amount of sodium in your diet. Lowering sodium while following the DASH diet can lower blood pressure even further than just the DASH diet alone. Follow-up care is a key part of your treatment and safety. Be sure to make and go to all appointments, and call your doctor if you are having problems. It's also a good idea to know your test results and keep a list of the medicines you take. How can you care for yourself at home? Following the DASH diet  · Eat 4 to 5 servings of fruit each day.  A serving is 1 medium-sized piece of fruit, ½ cup chopped or canned fruit, 1/4 cup dried fruit, or 4 ounces (½ cup) of fruit juice. Choose fruit more often than fruit juice. · Eat 4 to 5 servings of vegetables each day. A serving is 1 cup of lettuce or raw leafy vegetables, ½ cup of chopped or cooked vegetables, or 4 ounces (½ cup) of vegetable juice. Choose vegetables more often than vegetable juice. · Get 2 to 3 servings of low-fat and fat-free dairy each day. A serving is 8 ounces of milk, 1 cup of yogurt, or 1 ½ ounces of cheese. · Eat 6 to 8 servings of grains each day. A serving is 1 slice of bread, 1 ounce of dry cereal, or ½ cup of cooked rice, pasta, or cooked cereal. Try to choose whole-grain products as much as possible. · Limit lean meat, poultry, and fish to 2 servings each day. A serving is 3 ounces, about the size of a deck of cards. · Eat 4 to 5 servings of nuts, seeds, and legumes (cooked dried beans, lentils, and split peas) each week. A serving is 1/3 cup of nuts, 2 tablespoons of seeds, or ½ cup of cooked beans or peas. · Limit fats and oils to 2 to 3 servings each day. A serving is 1 teaspoon of vegetable oil or 2 tablespoons of salad dressing. · Limit sweets and added sugars to 5 servings or less a week. A serving is 1 tablespoon jelly or jam, ½ cup sorbet, or 1 cup of lemonade. · Eat less than 2,300 milligrams (mg) of sodium a day. If you limit your sodium to 1,500 mg a day, you can lower your blood pressure even more. Tips for success  · Start small. Do not try to make dramatic changes to your diet all at once. You might feel that you are missing out on your favorite foods and then be more likely to not follow the plan. Make small changes, and stick with them. Once those changes become habit, add a few more changes. · Try some of the following:  ¨ Make it a goal to eat a fruit or vegetable at every meal and at snacks. This will make it easy to get the recommended amount of fruits and vegetables each day.   ¨ Try yogurt topped with fruit and nuts for a snack or healthy dessert. ¨ Add lettuce, tomato, cucumber, and onion to sandwiches. ¨ Combine a ready-made pizza crust with low-fat mozzarella cheese and lots of vegetable toppings. Try using tomatoes, squash, spinach, broccoli, carrots, cauliflower, and onions. ¨ Have a variety of cut-up vegetables with a low-fat dip as an appetizer instead of chips and dip. ¨ Sprinkle sunflower seeds or chopped almonds over salads. Or try adding chopped walnuts or almonds to cooked vegetables. ¨ Try some vegetarian meals using beans and peas. Add garbanzo or kidney beans to salads. Make burritos and tacos with mashed chaidez beans or black beans. Where can you learn more? Go to http://scooterZebitfelicity.info/. Enter V471 in the search box to learn more about \"DASH Diet: Care Instructions. \"  Current as of: September 21, 2016  Content Version: 11.4  © 7846-9222 [employer]. Care instructions adapted under license by [employer] (which disclaims liability or warranty for this information). If you have questions about a medical condition or this instruction, always ask your healthcare professional. [address] any warranty or liability for your use of this information. High Blood Pressure: Care Instructions  Your Care Instructions    If your blood pressure is usually above 140/90, you have high blood pressure, or hypertension. That means the top number is 140 or higher or the bottom number is 90 or higher, or both. Despite what a lot of people think, high blood pressure usually doesn't cause headaches or make you feel dizzy or lightheaded. It usually has no symptoms. But it does increase your risk for heart attack, stroke, and kidney or eye damage. The higher your blood pressure, the more your risk increases. Your doctor will give you a goal for your blood pressure. Your goal will be based on your health and your age.  An example of a goal is to keep your blood pressure below 140/90. Lifestyle changes, such as eating healthy and being active, are always important to help lower blood pressure. You might also take medicine to reach your blood pressure goal.  Follow-up care is a key part of your treatment and safety. Be sure to make and go to all appointments, and call your doctor if you are having problems. It's also a good idea to know your test results and keep a list of the medicines you take. How can you care for yourself at home? Medical treatment  · If you stop taking your medicine, your blood pressure will go back up. You may take one or more types of medicine to lower your blood pressure. Be safe with medicines. Take your medicine exactly as prescribed. Call your doctor if you think you are having a problem with your medicine. · Talk to your doctor before you start taking aspirin every day. Aspirin can help certain people lower their risk of a heart attack or stroke. But taking aspirin isn't right for everyone, because it can cause serious bleeding. · See your doctor regularly. You may need to see the doctor more often at first or until your blood pressure comes down. · If you are taking blood pressure medicine, talk to your doctor before you take decongestants or anti-inflammatory medicine, such as ibuprofen. Some of these medicines can raise blood pressure. · Learn how to check your blood pressure at home. Lifestyle changes  · Stay at a healthy weight. This is especially important if you put on weight around the waist. Losing even 10 pounds can help you lower your blood pressure. · If your doctor recommends it, get more exercise. Walking is a good choice. Bit by bit, increase the amount you walk every day. Try for at least 30 minutes on most days of the week. You also may want to swim, bike, or do other activities. · Avoid or limit alcohol. Talk to your doctor about whether you can drink any alcohol.   · Try to limit how much sodium you eat to less than 2,300 milligrams (mg) a day. Your doctor may ask you to try to eat less than 1,500 mg a day. · Eat plenty of fruits (such as bananas and oranges), vegetables, legumes, whole grains, and low-fat dairy products. · Lower the amount of saturated fat in your diet. Saturated fat is found in animal products such as milk, cheese, and meat. Limiting these foods may help you lose weight and also lower your risk for heart disease. · Do not smoke. Smoking increases your risk for heart attack and stroke. If you need help quitting, talk to your doctor about stop-smoking programs and medicines. These can increase your chances of quitting for good. When should you call for help? Call 911 anytime you think you may need emergency care. This may mean having symptoms that suggest that your blood pressure is causing a serious heart or blood vessel problem. Your blood pressure may be over 180/110. ? For example, call 911 if:  ? · You have symptoms of a heart attack. These may include:  ¨ Chest pain or pressure, or a strange feeling in the chest.  ¨ Sweating. ¨ Shortness of breath. ¨ Nausea or vomiting. ¨ Pain, pressure, or a strange feeling in the back, neck, jaw, or upper belly or in one or both shoulders or arms. ¨ Lightheadedness or sudden weakness. ¨ A fast or irregular heartbeat. ? · You have symptoms of a stroke. These may include:  ¨ Sudden numbness, tingling, weakness, or loss of movement in your face, arm, or leg, especially on only one side of your body. ¨ Sudden vision changes. ¨ Sudden trouble speaking. ¨ Sudden confusion or trouble understanding simple statements. ¨ Sudden problems with walking or balance. ¨ A sudden, severe headache that is different from past headaches. ? · You have severe back or belly pain. ?Do not wait until your blood pressure comes down on its own. Get help right away.   ?Call your doctor now or seek immediate care if:  ? · Your blood pressure is much higher than normal (such as 180/110 or higher), but you don't have symptoms. ? · You think high blood pressure is causing symptoms, such as:  ¨ Severe headache. ¨ Blurry vision. ? Watch closely for changes in your health, and be sure to contact your doctor if:  ? · Your blood pressure measures 140/90 or higher at least 2 times. That means the top number is 140 or higher or the bottom number is 90 or higher, or both. ? · You think you may be having side effects from your blood pressure medicine. ? · Your blood pressure is usually normal, but it goes above normal at least 2 times. Where can you learn more? Go to http://scooter-felicity.info/. Enter N917 in the search box to learn more about \"High Blood Pressure: Care Instructions. \"  Current as of: September 21, 2016  Content Version: 11.4  © 7137-0946 [employer]. Care instructions adapted under license by [employer] (which disclaims liability or warranty for this information). If you have questions about a medical condition or this instruction, always ask your healthcare professional. [place] [address] any warranty or liability for your use of this information.

## 2018-05-02 NOTE — LETTER
5/3/2018 1:03 PM 
 
Ms. Orvil Peabody [address] [facility] [place] [id number] Dear Orvil Peabody: 
 
Please find your most recent results below. Resulted Orders CBC W/O DIFF Result Value Ref Range WBC 5.2 3.4 - 10.8 x10E3/uL  
 RBC 4.53 3.77 - 5.28 x10E6/uL HGB 12.3 11.1 - 15.9 g/dL HCT 37.9 34.0 - 46.6 % MCV 84 79 - 97 fL  
 MCH 27.2 26.6 - 33.0 pg  
 MCHC 32.5 31.5 - 35.7 g/dL  
 RDW 14.3 12.3 - 15.4 % PLATELET 862 981 - 754 x10E3/uL Narrative Performed at:  [address]  [id number] : Kathy Joya MD, Phone:  [phone] METABOLIC PANEL, COMPREHENSIVE Result Value Ref Range Glucose 92 65 - 99 mg/dL BUN 11 8 - 27 mg/dL Creatinine 0.69 0.57 - 1.00 mg/dL GFR est non-AA 92 >59 mL/min/1.73 GFR est  >59 mL/min/1.73  
 BUN/Creatinine ratio 16 12 - 28 Sodium 144 134 - 144 mmol/L Potassium 4.0 3.5 - 5.2 mmol/L Chloride 102 96 - 106 mmol/L  
 CO2 30 (H) 18 - 29 mmol/L Calcium 8.9 8.7 - 10.3 mg/dL Protein, total 6.8 6.0 - 8.5 g/dL Albumin 4.2 3.6 - 4.8 g/dL GLOBULIN, TOTAL 2.6 1.5 - 4.5 g/dL A-G Ratio 1.6 1.2 - 2.2 Bilirubin, total 0.7 0.0 - 1.2 mg/dL Alk. phosphatase 117 39 - 117 IU/L  
 AST (SGOT) 14 0 - 40 IU/L  
 ALT (SGPT) 7 0 - 32 IU/L Narrative Performed at:  [address]  [id number] : Kathy Joya MD, Phone:  [phone] LIPID PANEL Result Value Ref Range Cholesterol, total 185 100 - 199 mg/dL Triglyceride 74 0 - 149 mg/dL HDL Cholesterol 54 >39 mg/dL VLDL, calculated 15 5 - 40 mg/dL LDL, calculated 116 (H) 0 - 99 mg/dL Narrative Performed at:  [address]  [id number] : Kathy Joya MD, Phone:  [phone] TSH 3RD GENERATION Result Value Ref Range TSH 5.050 (H) 0.450 - 4.500 uIU/mL Narrative Performed at:  [address]  [id number] : Beto Jones MD, Phone:  [phone] VITAMIN D, 25 HYDROXY Result Value Ref Range VITAMIN D, 25-HYDROXY 15.1 (L) 30.0 - 100.0 ng/mL Comment:  
   Vitamin D deficiency has been defined by the [address] [place] [address] practice guideline as a 
level of serum 25-OH vitamin D less than 20 ng/mL (1,2). The Endocrine Society went on to further define vitamin D 
insufficiency as a level between 21 and 29 ng/mL (2). 1. IOM ([place] of Medicine). 2010. Dietary reference 
   intakes for calcium and D. [address] [place] [address]: The 
   PT [employer]. 2. Anny MF, Wilma WINTERS, Antonella DACOSTA, et al. 
   Evaluation, treatment, and prevention of vitamin D 
   deficiency: an Endocrine Society clinical practice 
   guideline. JCEM. 2011 Jul; 96(7):1911-30. Narrative Performed at:  [address]  [id number] : Beto Jones MD, Phone:  [phone] HEPATITIS C AB Result Value Ref Range Hep C Virus Ab <0.1 0.0 - 0.9 s/co ratio Comment:  
                                     Negative:     < 0.8 Indeterminate: 0.8 - 0.9 Positive:     > 0.9 The CDC recommends that a positive HCV antibody result 
 be followed up with a HCV Nucleic Acid Amplification 
 test (840110). Narrative Performed at:  [address]  [id number] : Beto Jones MD, Phone:  [phone] CVD REPORT Result Value Ref Range INTERPRETATION Note Comment:  
   Supplemental report is available. Narrative Performed at:  [address] [place] [address]  [id number] : Karen Kelly MD, Phone:  [phone] RECOMMENDATIONS: 
 
 1.  LDL slightly elevated.  Recommend that patient watch diet for fatty foods and exercise as tolerated. 2.  TSH is high.    Need to start Synthroid 75 mcg PO daily.  Take 30 min before breakfast.  Need to repeat TSH in 4-6 weeks. 3.  Vitamin D low.  Vitamin D 50,000 units weekly x 12 weeks.  After completion of 12 weeks, recommend OTC Vitamin D3 1000 units daily. 4. Other labs stable. Please call me if you have any questions: [phone] Sincerely, Jordyn Ardon NP

## 2018-05-02 NOTE — MR AVS SNAPSHOT
[address] [facility] 102 1400 [address] [place] [address] 
[phone] Patient: Gisela Madrigal MRN: [id number] ZPU:[birth year] Visit Information Date & Time Provider Department Dept. Phone Encounter #  
 5/2/2018 10:00 AM Crystal Lay, [address] [facility] Internal Medicine [phone] [id number] Upcoming Health Maintenance Date Due Hepatitis C Screening 1950 DTaP/Tdap/Td series (1 - Tdap) 8/28/1973 FOBT Q 1 YEAR AGE 50-75 8/28/2002 ZOSTER VACCINE AGE 60> 6/28/2012 GLAUCOMA SCREENING Q2Y 8/28/2017 Bone Densitometry (Dexa) Screening 8/28/2017 Pneumococcal 65+ High/Highest Risk (1 of 2 - PCV13) 8/28/2017 MEDICARE YEARLY EXAM 3/28/2018 Influenza Age 5 to Adult 8/1/2018 BREAST CANCER SCRN MAMMOGRAM 2/8/2020 Allergies as of 5/2/2018  Review Complete On: 5/2/2018 By: Crystal Lay NP No Known Allergies Current Immunizations  Reviewed on 5/15/2017 Name Date Influenza Vaccine Whole 10/22/2011 Not reviewed this visit You Were Diagnosed With   
  
 Codes Comments Essential hypertension    -  Primary ICD-10-CM: I10 
ICD-9-CM: 401.9 Malignant neoplasm of left breast, stage 2 ([facility] [address]     ICD-10-CM: R10.330 ICD-9-CM: 174.9 Encounter for vitamin deficiency screening     ICD-10-CM: Z13.21 ICD-9-CM: V77.99 Encounter for hepatitis C screening test for low risk patient     ICD-10-CM: Z11.59 
ICD-9-CM: V73.89 Vitals BP Pulse Temp Resp Height(growth percentile) Weight(growth percentile) (!) 184/93 (BP 1 Location: Right arm, BP Patient Position: Sitting) 84 97.9 °F (36.6 °C) (Oral) 18 5' 1\" (1.549 m) 182 lb 9.6 oz (82.8 kg) SpO2 BMI OB Status Smoking Status 98% 34.5 kg/m2 Postmenopausal Never Smoker Vitals History BMI and BSA Data Body Mass Index Body Surface Area 34.5 kg/m 2 1.89 m 2 Preferred Pharmacy Pharmacy Name Phone [address] [facility] [phone] Your Updated Medication List  
  
   
This list is accurate as of 5/2/18 10:12 AM.  Always use your most recent med list.  
  
  
  
  
 ASPIR-81 81 mg tablet Generic drug:  aspirin delayed-release Take  by mouth daily. ibuprofen 200 mg tablet Commonly known as:  MOTRIN Take 200 mg by mouth every six (6) hours as needed. ketorolac 0.5 % ophthalmic solution Commonly known as:  ACULAR  
  
 lisinopril-hydroCHLOROthiazide 10-12.5 mg per tablet Commonly known as:  Gweneth Rasher Take 1 Tab by mouth daily. MULTIVITAMIN PO Take  by mouth daily. ofloxacin 0.3 % ophthalmic solution Commonly known as:  FLOXIN  
  
 prednisoLONE acetate 1 % ophthalmic suspension Commonly known as:  PRED FORTE Prescriptions Sent to Pharmacy Refills  
 lisinopril-hydroCHLOROthiazide (PRINZIDE, ZESTORETIC) 10-12.5 mg per tablet 1 Sig: Take 1 Tab by mouth daily. Class: Normal  
 Pharmacy: [facility] [address] [facility] Ph #: [phone] Route: Oral  
  
We Performed the Following CBC W/O DIFF [71985 CPT(R)] HEPATITIS C AB [50759 CPT(R)] LIPID PANEL [85553 CPT(R)] METABOLIC PANEL, COMPREHENSIVE [63673 CPT(R)] TSH 3RD GENERATION [26360 CPT(R)] VITAMIN D, 25 HYDROXY [id number] CPT(R)] Introducing [facility]! Aicha De introduces [employer] patient portal. Now you can access parts of your medical record, email your doctor's office, and request medication refills online. 1. In your internet browser, go to [email or web address] More 2. Click on the First Time User? Click Here link in the Sign In box. You will see the New Member Sign Up page. 3. Enter your [employer] Access Code exactly as it appears below. You will not need to use this code after youve completed the sign-up process.  If you do not sign up before the expiration date, you must request a new code. · RUN Access Code: [email or web address] Expires: 5/28/2018  6:09 PM 
 
4. Enter the last four digits of your Social Security Number (xxxx) and Date of Birth (mm/dd/yyyy) as indicated and click Submit. You will be taken to the next sign-up page. 5. Create a RUN ID. This will be your RUN login ID and cannot be changed, so think of one that is secure and easy to remember. 6. Create a RUN password. You can change your password at any time. 7. Enter your Password Reset Question and Answer. This can be used at a later time if you forget your password. 8. Enter your e-mail address. You will receive e-mail notification when new information is available in [address] 9. Click Sign Up. You can now view and download portions of your medical record. 10. Click the Download Summary menu link to download a portable copy of your medical information. If you have questions, please visit the Frequently Asked Questions section of the RUN website. Remember, RUN is NOT to be used for urgent needs. For medical emergencies, dial 911. Now available from your iPhone and Android! Please provide this summary of care documentation to your next provider. Your primary care clinician is listed as Ashley Nolen. If you have any questions after today's visit, please call [phone].

## 2018-05-02 NOTE — PROGRESS NOTES
Health Maintenance Due   Topic Date Due    Hepatitis C Screening  1950    DTaP/Tdap/Td series (1 - Tdap) 08/28/1973    FOBT Q 1 YEAR AGE 50-75  08/28/2002    ZOSTER VACCINE AGE 60>  06/28/2012    GLAUCOMA SCREENING Q2Y  08/28/2017    Bone Densitometry (Dexa) Screening  08/28/2017    Pneumococcal 65+ High/Highest Risk (1 of 2 - PCV13) 08/28/2017    MEDICARE YEARLY EXAM  03/28/2018       Chief Complaint   Patient presents with   Baudilio Vaca Pre-op Exam     Cateract Surgery     New Patient       1. Have you been to the ER, urgent care clinic since your last visit? Hospitalized since your last visit? No    2. Have you seen or consulted any other health care providers outside of the [address] [place] [address] since your last visit? Include any pap smears or colon screening. No    3) Do you have an Advance Directive on file? no    4) Are you interested in receiving information on Advance Directives? NO      Patient is accompanied by self I have received verbal consent from Renee Rivera to discuss any/all medical information while they are present in the room.

## 2018-05-02 NOTE — PROGRESS NOTES
Subjective:     Chief Complaint   Patient presents with    Pre-op Exam     Cateract Surgery     New Patient       History of Present Illness    Deisy Rocha is a 74y.o. year old female who presents as a new patient to [facility] care. She is due to have cataract surgery later this month. She has pre-op paperwork to be completed. Her PMH includes breast cancer, HTN, lymphedema, arthritis, and vitamin d deficiency. She denies any new complaints today. She states she was on BP meds 8 years ago but stopped taking them due to insurance issues. She is hypertensive today. She denies any cardiac related complaints. She has a hx of breast cancer. She is followed by Dr. Berta Marvin, oncology. She underwent a lumpectomy and radiation. She also has a hx of lymphedema to her left arm. She wears a lymphedema sleeve. She was previously seen at the lymphedema clinic. She states she no longer goes because it is not covered by medicare. She denies any other new complaints today. NAD. No CP, SOB, GI, or  symptoms. Reviewed medications, recent lab work and imaging with patient. Pt reports compliance with medications. Current Outpatient Prescriptions on File Prior to Visit   Medication Sig Dispense Refill    MULTIVITAMIN PO Take  by mouth daily.  ibuprofen (MOTRIN) 200 mg tablet Take 200 mg by mouth every six (6) hours as needed.  aspirin delayed-release (ASPIR-81) 81 mg tablet Take  by mouth daily. No current facility-administered medications on file prior to visit.         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[place])    Hypertension     S/P radiation therapy 2012    also chemo      Past Surgical History:   Procedure Laterality Date    BREAST SURGERY PROCEDURE UNLISTED      Left Breast Biopsy on 09/02/11    HX BREAST LUMPECTOMY  4/26/12    LEFT breast lumpectomy with axillary node dissection     HX TUBAL LIGATION      HX VASCULAR ACCESS      portacath  X2      Social History   Substance Use Topics    Smoking status: Never Smoker    Smokeless tobacco: Never Used    Alcohol use Yes      Comment: rare      Family History   Problem Relation Age of Onset   [address] [facility] Breast Cancer Mother 48    Cancer Mother 48     breast    Heart Disease Father      MI    Diabetes Father     Diabetes Paternal Aunt     Diabetes Paternal Aunt         Objective:     Vitals:    05/02/18 0956   BP: (!) 184/93   Pulse: 84   Resp: 18   Temp: 97.9 °F (36.6 °C)   TempSrc: Oral   SpO2: 98%   Weight: 182 lb 9.6 oz (82.8 kg)   Height: 5' 1\" (1.549 m)       Review of Systems   Constitutional: Negative. HENT: Negative. Eyes: Negative. Respiratory: Negative. Cardiovascular: Negative. Gastrointestinal: Negative. Genitourinary: Negative. Musculoskeletal: Negative. Skin: Negative. Neurological: Negative. Psychiatric/Behavioral: Negative. Physical Exam   Constitutional: She is oriented to person, place, and time. She appears well-developed and well-nourished. No distress. Well-appearing  female. NAD   HENT:   Head: Normocephalic and atraumatic. Eyes: Conjunctivae and EOM are normal. Pupils are equal, round, and reactive to light. Neck: Normal range of motion. Neck supple. Cardiovascular: Normal rate, regular rhythm and normal heart sounds. Pulmonary/Chest: Effort normal and breath sounds normal. No respiratory distress. She has no wheezes. Abdominal: Soft. Bowel sounds are normal. She exhibits no distension. There is no tenderness. Musculoskeletal: Normal range of motion. She exhibits edema. She exhibits no tenderness or deformity. 1+ BLE edema. Left arm edema r/t lymphedema. Wearing lymphedema sleeve. Neurological: She is alert and oriented to person, place, and time. Skin: Skin is warm and dry. No rash noted. She is not diaphoretic. No erythema.  No pallor. Psychiatric: She has a normal mood and affect. Her behavior is normal.   Nursing note and vitals reviewed. Assessment/ Plan:   Diagnoses and all orders for this visit:    1. Essential hypertension   Will order  -     lisinopril-hydroCHLOROthiazide (PRINZIDE, ZESTORETIC) 10-12.5 mg per tablet; Take 1 Tab by mouth daily.  -     CBC W/O DIFF  -     METABOLIC PANEL, COMPREHENSIVE  -     LIPID PANEL  -     TSH 3RD GENERATION    2. Malignant neoplasm of left breast, stage 2 (HCC)   Will order  -     CBC W/O DIFF  -     METABOLIC PANEL, COMPREHENSIVE  -     LIPID PANEL  -     TSH 3RD GENERATION    3. Lymphedema   Will order  -     CBC W/O DIFF  -     METABOLIC PANEL, COMPREHENSIVE    4. Encounter for vitamin deficiency screening   Will order  -     VITAMIN D, 25 HYDROXY    5. Encounter for hepatitis C screening test for low risk patient   Will order  -     HEPATITIS C AB    Patient's plan of care has been reviewed with them. Patient and/or family have verbally conveyed their understanding and agreement of the patient's signs, symptoms, diagnosis, treatment and prognosis and additionally agree to follow up as recommended or return to [facility] should their condition change prior to follow-up. Discharge instructions have also been provided to the patient with some educational information regarding their diagnosis as well a list of reasons why they would want to return to the office prior to their follow-up appointment should their condition change. Follow-up in 1 month.

## 2018-05-03 LAB
25(OH)D3+25(OH)D2 SERPL-MCNC: 15.1 NG/ML (ref 30–100)
ALBUMIN SERPL-MCNC: 4.2 G/DL (ref 3.6–4.8)
ALBUMIN/GLOB SERPL: 1.6 {RATIO} (ref 1.2–2.2)
ALP SERPL-CCNC: 117 IU/L (ref 39–117)
ALT SERPL-CCNC: 7 IU/L (ref 0–32)
AST SERPL-CCNC: 14 IU/L (ref 0–40)
BILIRUB SERPL-MCNC: 0.7 MG/DL (ref 0–1.2)
BUN SERPL-MCNC: 11 MG/DL (ref 8–27)
BUN/CREAT SERPL: 16 (ref 12–28)
CALCIUM SERPL-MCNC: 8.9 MG/DL (ref 8.7–10.3)
CHLORIDE SERPL-SCNC: 102 MMOL/L (ref 96–106)
CHOLEST SERPL-MCNC: 185 MG/DL (ref 100–199)
CO2 SERPL-SCNC: 30 MMOL/L (ref 18–29)
CREAT SERPL-MCNC: 0.69 MG/DL (ref 0.57–1)
ERYTHROCYTE [DISTWIDTH] IN BLOOD BY AUTOMATED COUNT: 14.3 % (ref 12.3–15.4)
GFR SERPLBLD CREATININE-BSD FMLA CKD-EPI: 106 ML/MIN/1.73
GFR SERPLBLD CREATININE-BSD FMLA CKD-EPI: 92 ML/MIN/1.73
GLOBULIN SER CALC-MCNC: 2.6 G/DL (ref 1.5–4.5)
GLUCOSE SERPL-MCNC: 92 MG/DL (ref 65–99)
HCT VFR BLD AUTO: 37.9 % (ref 34–46.6)
HCV AB S/CO SERPL IA: <0.1 S/CO RATIO (ref 0–0.9)
HDLC SERPL-MCNC: 54 MG/DL
HGB BLD-MCNC: 12.3 G/DL (ref 11.1–15.9)
INTERPRETATION, 910389: NORMAL
LDLC SERPL CALC-MCNC: 116 MG/DL (ref 0–99)
MCH RBC QN AUTO: 27.2 PG (ref 26.6–33)
MCHC RBC AUTO-ENTMCNC: 32.5 G/DL (ref 31.5–35.7)
MCV RBC AUTO: 84 FL (ref 79–97)
PLATELET # BLD AUTO: 260 X10E3/UL (ref 150–379)
POTASSIUM SERPL-SCNC: 4 MMOL/L (ref 3.5–5.2)
PROT SERPL-MCNC: 6.8 G/DL (ref 6–8.5)
RBC # BLD AUTO: 4.53 X10E6/UL (ref 3.77–5.28)
SODIUM SERPL-SCNC: 144 MMOL/L (ref 134–144)
TRIGL SERPL-MCNC: 74 MG/DL (ref 0–149)
TSH SERPL DL<=0.005 MIU/L-ACNC: 5.05 UIU/ML (ref 0.45–4.5)
VLDLC SERPL CALC-MCNC: 15 MG/DL (ref 5–40)
WBC # BLD AUTO: 5.2 X10E3/UL (ref 3.4–10.8)

## 2018-05-03 RX ORDER — ERGOCALCIFEROL 1.25 MG/1
50000 CAPSULE ORAL
Qty: 12 CAP | Refills: 0 | Status: SHIPPED | OUTPATIENT
Start: 2018-05-03 | End: 2019-07-24 | Stop reason: ALTCHOICE

## 2018-05-03 RX ORDER — LEVOTHYROXINE SODIUM 75 UG/1
75 TABLET ORAL
Qty: 30 TAB | Refills: 1 | Status: SHIPPED | OUTPATIENT
Start: 2018-05-03 | End: 2018-07-06 | Stop reason: SDUPTHER

## 2018-05-03 NOTE — PROGRESS NOTES
1.  LDL slightly elevated. Recommend that patient watch diet for fatty foods and exercise as tolerated. 2.  TSH is high. Any hx of hypothyroid? Need to start Synthroid 75 mcg PO daily. Take 30 min before breakfast.  Need to repeat TSH in 4-6 weeks. 3.  Vitamin D low. Vitamin D 50,000 units weekly x 12 weeks. After completion of 12 weeks, recommend OTC Vitamin D3 1000 units daily. Other labs stable.

## 2018-06-05 ENCOUNTER — OFFICE VISIT (OUTPATIENT)
Dept: INTERNAL MEDICINE CLINIC | Age: 68
End: 2018-06-05

## 2018-06-05 VITALS
SYSTOLIC BLOOD PRESSURE: 131 MMHG | OXYGEN SATURATION: 97 % | HEIGHT: 61 IN | WEIGHT: 177.2 LBS | HEART RATE: 69 BPM | BODY MASS INDEX: 33.46 KG/M2 | DIASTOLIC BLOOD PRESSURE: 47 MMHG | RESPIRATION RATE: 18 BRPM | TEMPERATURE: 97 F

## 2018-06-05 DIAGNOSIS — I89.0 LYMPHEDEMA OF ARM: ICD-10-CM

## 2018-06-05 DIAGNOSIS — E03.9 ACQUIRED HYPOTHYROIDISM: ICD-10-CM

## 2018-06-05 DIAGNOSIS — Z00.00 MEDICARE ANNUAL WELLNESS VISIT, INITIAL: ICD-10-CM

## 2018-06-05 DIAGNOSIS — Z12.11 SCREENING FOR COLORECTAL CANCER: ICD-10-CM

## 2018-06-05 DIAGNOSIS — Z23 ENCOUNTER FOR IMMUNIZATION: ICD-10-CM

## 2018-06-05 DIAGNOSIS — Z12.12 SCREENING FOR COLORECTAL CANCER: ICD-10-CM

## 2018-06-05 DIAGNOSIS — Z71.89 ACP (ADVANCE CARE PLANNING): ICD-10-CM

## 2018-06-05 DIAGNOSIS — C50.912 MALIGNANT NEOPLASM OF LEFT BREAST, STAGE 2 (HCC): ICD-10-CM

## 2018-06-05 DIAGNOSIS — I10 ESSENTIAL HYPERTENSION: Primary | ICD-10-CM

## 2018-06-05 NOTE — PROGRESS NOTES
Deisy Rocha is a 74 y.o. female and presents for annual Medicare Wellness Visit. Problem List: Reviewed with patient and discussed risk factors.     Patient Active Problem List   Diagnosis Code    HTN (hypertension) I10    Breast cancer, stage 2 (HCC) C50.919    Lymphedema of arm I89.0    Arthritis M19.90    Right shoulder pain M25.511    S/P breast lumpectomy Z98.890    Vitamin D deficiency E55.9    Insomnia G47.00    Skin lesion of breast N64.9    Advanced care planning/counseling discussion Z71.89    Acquired hypothyroidism E03.9       Current medical providers:  Patient Care Team:  Jagdish Lynch NP as PCP - General (Nurse Practitioner)  Kim Child MD as Physician (Radiation Oncology)  Jackson Gonsalves DO as Physician (Oncology)  Jeff Adler MD as Physician (Breast Surgery)  Ed Bravo NP (Nurse Practitioner)  Jeff Adler MD (Breast Surgery)  Jeff Adler MD (Breast Surgery)  Jeff Adler MD (Breast Surgery)  ANNABELLE Wyatt LPN    PSH: Reviewed with patient  Past Surgical History:   Procedure Laterality Date    BREAST SURGERY PROCEDURE UNLISTED      Left Breast Biopsy on 09/02/11    HX BREAST LUMPECTOMY  4/26/12    LEFT breast lumpectomy with axillary node dissection     HX TUBAL LIGATION      HX VASCULAR ACCESS      portacath  X2        SH: Reviewed with patient  Social History   Substance Use Topics    Smoking status: Never Smoker    Smokeless tobacco: Never Used    Alcohol use Yes      Comment: rare       FH: Reviewed with patient  Family History   Problem Relation Age of Onset    Breast Cancer Mother 48    Cancer Mother 48     breast    Heart Disease Father      MI    Diabetes Father     Diabetes Paternal Aunt     Diabetes Paternal Aunt        Medications/Allergies: Reviewed with patient  Current Outpatient Prescriptions on File Prior to Visit   Medication Sig Dispense Refill    ergocalciferol (ERGOCALCIFEROL) 50,000 unit capsule Take 1 Cap by mouth every seven (7) days. 12 Cap 0    levothyroxine (SYNTHROID) 75 mcg tablet Take 1 Tab by mouth Daily (before breakfast). 30 Tab 1    ketorolac (ACULAR) 0.5 % ophthalmic solution       ofloxacin (FLOXIN) 0.3 % ophthalmic solution       prednisoLONE acetate (PRED FORTE) 1 % ophthalmic suspension       lisinopril-hydroCHLOROthiazide (PRINZIDE, ZESTORETIC) 10-12.5 mg per tablet Take 1 Tab by mouth daily. 30 Tab 1    MULTIVITAMIN PO Take  by mouth daily.  ibuprofen (MOTRIN) 200 mg tablet Take 200 mg by mouth every six (6) hours as needed.  aspirin delayed-release (ASPIR-81) 81 mg tablet Take  by mouth daily. No current facility-administered medications on file prior to visit. No Known Allergies    Objective:  Visit Vitals    /47 (BP 1 Location: Right arm, BP Patient Position: Sitting)    Pulse 69    Temp 97 °F (36.1 °C) (Oral)    Resp 18    Ht 5' 1\" (1.549 m)    Wt 177 lb 3.2 oz (80.4 kg)    SpO2 97%    BMI 33.48 kg/m2    Body mass index is 33.48 kg/(m^2).     Assessment of cognitive impairment: Alert and oriented x 3    Depression Screen:   PHQ over the last two weeks 6/5/2018   Little interest or pleasure in doing things More than half the days   Feeling down, depressed or hopeless More than half the days   Total Score PHQ 2 4   Trouble falling or staying asleep, or sleeping too much More than half the days   Feeling tired or having little energy More than half the days   Poor appetite or overeating Not at all   Feeling bad about yourself - or that you are a failure or have let yourself or your family down Not at all   Trouble concentrating on things such as school, work, reading or watching TV Not at all   Moving or speaking so slowly that other people could have noticed; or the opposite being so fidgety that others notice Not at all   Thoughts of being better off dead, or hurting yourself in some way Not at all   PHQ 9 Score 8   How difficult have these problems made it for you to do your work, take care of your home and get along with others Not difficult at all       Fall Risk Assessment:    Fall Risk Assessment, last 12 mths 6/5/2018   Able to walk? Yes   Fall in past 12 months? No       Functional Ability:   Does the patient exhibit a steady gait? yes   How long did it take the patient to get up and walk from a sitting position? <5 seconds   Is the patient self reliant?  (ie can do own laundry, meals, household chores)  yes     Does the patient handle his/her own medications? yes     Does the patient handle his/her own money? yes     Is the patients home safe (ie good lighting, handrails on stairs and bath, etc.)? yes     Did you notice or did patient express any hearing difficulties? no     Did you notice or did patient express any vision difficulties? no            Advance Care Planning:   Patient was offered the opportunity to discuss advance care planning:  yes     Does patient have an Advance Directive:  no          Plan:      Orders Placed This Encounter    PNEUMOCOCCAL CONJ VACCINE 13 VALENT IM    COLOGUARD TEST (FECAL DNA COLORECTAL CANCER SCREENING)       Health Maintenance   Topic Date Due    DTaP/Tdap/Td series (1 - Tdap) 08/28/1973    Cologuard Q 3 Years  08/28/2002    ZOSTER VACCINE AGE 60>  06/28/2012    GLAUCOMA SCREENING Q2Y  08/28/2017    Bone Densitometry (Dexa) Screening  08/28/2017    Pneumococcal 65+ High/Highest Risk (1 of 2 - PCV13) 08/28/2017    Influenza Age 9 to Adult  08/01/2018    MEDICARE YEARLY EXAM  06/06/2019    BREAST CANCER SCRN MAMMOGRAM  02/08/2020    Hepatitis C Screening  Completed       *Patient verbalized understanding and agreement with the plan. A copy of the After Visit Summary with personalized health plan was given to the patient today.

## 2018-06-05 NOTE — PROGRESS NOTES
Health Maintenance Due   Topic Date Due    DTaP/Tdap/Td series (1 - Tdap) 08/28/1973    FOBT Q 1 YEAR AGE 50-75  08/28/2002    ZOSTER VACCINE AGE 60>  06/28/2012    GLAUCOMA SCREENING Q2Y  08/28/2017    Bone Densitometry (Dexa) Screening  08/28/2017    Pneumococcal 65+ High/Highest Risk (1 of 2 - PCV13) 08/28/2017    MEDICARE YEARLY EXAM  03/28/2018       Chief Complaint   Patient presents with    Follow-up     LOV 5/2/18    Annual Wellness Visit     FOBT, Dexa 8/28/17    Eye Problem     Surgery 6/6/18,        1. Have you been to the ER, urgent care clinic since your last visit? Hospitalized since your last visit? No    2. Have you seen or consulted any other health care providers outside of the [address] [place] [address] since your last visit? Include any pap smears or colon screening. No    3) Do you have an Advance Directive on file? no    4) Are you interested in receiving information on Advance Directives? NO      Patient is accompanied by self I have received verbal consent from Renee Rivera to discuss any/all medical information while they are present in the room. Patient states she feels stressed r/t job, some anxiety, and depression. Has no affect on her ADLs or appetite, does affect sleep patterns. Authorization of Release of Medical Information signed & faxed for glaucoma/ recent eye exam for liz WALLACE/ Dr. Bhaskar Uribe.

## 2018-06-05 NOTE — PROGRESS NOTES
Subjective:     Chief Complaint   Patient presents with    Follow-up     LOV 5/2/18    Annual Wellness Visit     FOBT, Dexa 8/28/17    Eye Problem     Surgery 6/6/18,        History of Present Illness    Ciro Escobedo is a 74y.o. year old female who presents for follow-up. She reports feeling generally well today. She is scheduled to have a 2nd cataract surgery tomorrow. She was started on BP medication at her last visit. She is tolerating well and her BP is greatly improved. She also reports decreased swelling to her legs. She was started for on Synthroid for a new diagnosis of hypothyroid at her last visit. She reports compliance. She is due for prevnar 13 and colorectal screening. No other new complaints. NAD. No CP, SOB, GI, or  symptoms. Reviewed medications, recent lab work and imaging with patient. Pt reports compliance with medications. Current Outpatient Prescriptions on File Prior to Visit   Medication Sig Dispense Refill    ergocalciferol (ERGOCALCIFEROL) 50,000 unit capsule Take 1 Cap by mouth every seven (7) days. 12 Cap 0    levothyroxine (SYNTHROID) 75 mcg tablet Take 1 Tab by mouth Daily (before breakfast). 30 Tab 1    ketorolac (ACULAR) 0.5 % ophthalmic solution       ofloxacin (FLOXIN) 0.3 % ophthalmic solution       prednisoLONE acetate (PRED FORTE) 1 % ophthalmic suspension       lisinopril-hydroCHLOROthiazide (PRINZIDE, ZESTORETIC) 10-12.5 mg per tablet Take 1 Tab by mouth daily. 30 Tab 1    MULTIVITAMIN PO Take  by mouth daily.  ibuprofen (MOTRIN) 200 mg tablet Take 200 mg by mouth every six (6) hours as needed.  aspirin delayed-release (ASPIR-81) 81 mg tablet Take  by mouth daily. No current facility-administered medications on file prior to visit.         No Known Allergies   Past Medical History:   Diagnosis Date    Breast cancer ([facility] [address] 2012    Cancer [facility])     Left breast invasion ductal CA ([place])    Hypertension     S/P radiation therapy 2012    also chemo      Past Surgical History:   Procedure Laterality Date    BREAST SURGERY PROCEDURE UNLISTED      Left Breast Biopsy on 09/02/11    HX BREAST LUMPECTOMY  4/26/12    LEFT breast lumpectomy with axillary node dissection     HX TUBAL LIGATION      HX VASCULAR ACCESS      portacath  X2      Social History   Substance Use Topics    Smoking status: Never Smoker    Smokeless tobacco: Never Used    Alcohol use Yes      Comment: rare      Family History   Problem Relation Age of Onset   [facility] Breast Cancer Mother 48    Cancer Mother 48     breast    Heart Disease Father      MI    Diabetes Father     Diabetes Paternal Aunt     Diabetes Paternal Aunt         Objective:     Vitals:    06/05/18 1013   BP: 131/47   Pulse: 69   Resp: 18   Temp: 97 °F (36.1 °C)   TempSrc: Oral   SpO2: 97%   Weight: 177 lb 3.2 oz (80.4 kg)   Height: 5' 1\" (1.549 m)       Review of Systems   Constitutional: Negative. HENT: Negative. Eyes: Negative. Respiratory: Negative. Cardiovascular: Negative. Gastrointestinal: Negative. Genitourinary: Negative. Musculoskeletal: Negative. Skin: Negative. Neurological: Negative. Psychiatric/Behavioral: Negative. Physical Exam   Constitutional: She is oriented to person, place, and time. She appears well-developed and well-nourished. No distress. Well-appearing  female. NAD   HENT:   Head: Normocephalic and atraumatic. Eyes: Conjunctivae and EOM are normal. Pupils are equal, round, and reactive to light. Neck: Normal range of motion. Neck supple. Cardiovascular: Normal rate, regular rhythm and normal heart sounds. Pulmonary/Chest: Effort normal and breath sounds normal. No respiratory distress. She has no wheezes. Abdominal: Soft. Bowel sounds are normal. She exhibits no distension. There is no tenderness. Musculoskeletal: Normal range of motion. She exhibits edema. She exhibits no tenderness or deformity.    Trace pedal edema. Left arm edema r/t lymphedema. Wearing lymphedema sleeve. Neurological: She is alert and oriented to person, place, and time. Skin: Skin is warm and dry. No rash noted. She is not diaphoretic. No erythema. No pallor. Psychiatric: She has a normal mood and affect. Her behavior is normal.   Nursing note and vitals reviewed. Assessment/ Plan:   Diagnoses and all orders for this visit:    1. Essential hypertension   Stable continue current meds    2. Acquired hypothyroidism   Stable continue current meds    3. Malignant neoplasm of left breast, stage 2 ([facility] [address]   Followed by oncology    4. Lymphedema of arm    5. Encounter for immunization   Will give  -     PNEUMOCOCCAL CONJ VACCINE 13 VALENT IM    6. Screening for colorectal cancer   Will order  -     COLOGUARD TEST (FECAL DNA COLORECTAL CANCER SCREENING)    7. Medicare annual wellness visit, initial    8. ACP (advance care planning)    Patient's plan of care has been reviewed with them. Patient and/or family have verbally conveyed their understanding and agreement of the patient's signs, symptoms, diagnosis, treatment and prognosis and additionally agree to follow up as recommended or return to [facility] should their condition change prior to follow-up. Discharge instructions have also been provided to the patient with some educational information regarding their diagnosis as well a list of reasons why they would want to return to the office prior to their follow-up appointment should their condition change. Follow-up in 6 months.

## 2018-06-05 NOTE — MR AVS SNAPSHOT
[address] [place] [address] [facility] [address] 
[phone] Patient: Orin Singh MRN: [id number] HOF:[birth year] Visit Information Date & Time Provider Department Dept. Phone Encounter #  
 6/5/2018 10:00 AM Marcial Vargas NP [facility] Internal Medicine [phone] [id number] Upcoming Health Maintenance Date Due DTaP/Tdap/Td series (1 - Tdap) 8/28/1973 FOBT Q 1 YEAR AGE 50-75 8/28/2002 ZOSTER VACCINE AGE 60> 6/28/2012 GLAUCOMA SCREENING Q2Y 8/28/2017 Bone Densitometry (Dexa) Screening 8/28/2017 Pneumococcal 65+ High/Highest Risk (1 of 2 - PCV13) 8/28/2017 MEDICARE YEARLY EXAM 3/28/2018 Influenza Age 5 to Adult 8/1/2018 BREAST CANCER SCRN MAMMOGRAM 2/8/2020 Allergies as of 6/5/2018  Review Complete On: 6/5/2018 By: Addy Evans LPN No Known Allergies Current Immunizations  Reviewed on 5/15/2017 Name Date Influenza Vaccine Whole 10/22/2011 Not reviewed this visit Vitals BP Pulse Temp Resp Height(growth percentile) Weight(growth percentile) 131/47 (BP 1 Location: Right arm, BP Patient Position: Sitting) 69 97 °F (36.1 °C) (Oral) 18 5' 1\" (1.549 m) 177 lb 3.2 oz (80.4 kg) SpO2 BMI OB Status Smoking Status 97% 33.48 kg/m2 Postmenopausal Never Smoker Vitals History BMI and BSA Data Body Mass Index Body Surface Area  
 33.48 kg/m 2 1.86 m 2 Preferred Pharmacy Pharmacy Name Phone [address] [phone] Your Updated Medication List  
  
   
This list is accurate as of 6/5/18 10:25 AM.  Always use your most recent med list.  
  
  
  
  
 ASPIR-81 81 mg tablet Generic drug:  aspirin delayed-release Take  by mouth daily. ergocalciferol 50,000 unit capsule Commonly known as:  ERGOCALCIFEROL Take 1 Cap by mouth every seven (7) days. ibuprofen 200 mg tablet Commonly known as:  MOTRIN Take 200 mg by mouth every six (6) hours as needed. ketorolac 0.5 % ophthalmic solution Commonly known as:  ACULAR  
  
 levothyroxine 75 mcg tablet Commonly known as:  SYNTHROID Take 1 Tab by mouth Daily (before breakfast). lisinopril-hydroCHLOROthiazide 10-12.5 mg per tablet Commonly known as:  Shanthi Sherwood Take 1 Tab by mouth daily. MULTIVITAMIN PO Take  by mouth daily. ofloxacin 0.3 % ophthalmic solution Commonly known as:  FLOXIN  
  
 prednisoLONE acetate 1 % ophthalmic suspension Commonly known as:  PRED FORTE Introducing [facility]! [employer] introduces [employer] patient portal. Now you can access parts of your medical record, email your doctor's office, and request medication refills online. 1. In your internet browser, go to [email or web address] 2. Click on the First Time User? Click Here link in the Sign In box. You will see the New Member Sign Up page. 3. Enter your [employer] Access Code exactly as it appears below. You will not need to use this code after youve completed the sign-up process. If you do not sign up before the expiration date, you must request a new code. · [employer] Access Code: [email or web address] Expires: 9/3/2018 10:25 AM 
 
4. Enter the last four digits of your Social Security Number (xxxx) and Date of Birth (mm/dd/yyyy) as indicated and click Submit. You will be taken to the next sign-up page. 5. Create a [employer] ID. This will be your [employer] login ID and cannot be changed, so think of one that is secure and easy to remember. 6. Create a [employer] password. You can change your password at any time. 7. Enter your Password Reset Question and Answer. This can be used at a later time if you forget your password. 8. Enter your e-mail address. You will receive e-mail notification when new information is available in [address] 9. Click Sign Up. You can now view and download portions of your medical record. 10. Click the Download Summary menu link to download a portable copy of your medical information. If you have questions, please visit the Frequently Asked Questions section of the [employer] website. Remember, [employer] is NOT to be used for urgent needs. For medical emergencies, dial 911. Now available from your iPhone and Android! Please provide this summary of care documentation to your next provider. Your primary care clinician is listed as Ashley Nolen. If you have any questions after today's visit, please call [phone].

## 2018-06-05 NOTE — PATIENT INSTRUCTIONS
Schedule of Personalized Health Plan  (Provide Copy to Patient)  The best way to stay healthy is to live a healthy lifestyle. A healthy lifestyle includes regular exercise, eating a well-balanced diet, keeping a healthy weight and not smoking. Regular physical exams and screening tests are another important way to take care of yourself. Preventive exams provided by health care providers can find health problems early when treatment works best and can keep you from getting certain diseases or illnesses. Preventive services include exams, lab tests, screenings, shots, monitoring and information to help you take care of your own health. All people over 65 should have a pneumonia shot. Pneumonia shots are usually only needed once in a lifetime unless your doctor decides differently. All people over 65 should have a yearly flu shot. People over 65 are at medium to high risk for Hepatitis B. Three shots are needed for complete protection. In addition to your physical exam, some screening tests are recommended:    Bone mass measurement (dexa scan) is recommended every two years  Diabetes Mellitus screening is recommended every year. Glaucoma is an eye disease caused by high pressure in the eye. An eye exam is recommended every year. Cardiovascular screening tests that check your cholesterol and other blood fat (lipid) levels are recommended every five years. Colorectal Cancer screening tests help to find pre-cancerous polyps (growths in the colon) so they can be removed before they turn into cancer. Tests ordered for screening depend on your personal and family history risk factors.     Screening for Breast Cancer is recommended yearly with a mammogram.    Screening for Cervical Cancer is recommended every two years (annually for certain risk factors, such as previous history of STD or abnormal PAP in past 7 years), with a Pelvic Exam with PAP    Here is a list of your current Health Maintenance items with a due date:  Health Maintenance   Topic Date Due    DTaP/Tdap/Td series (1 - Tdap) 08/28/1973    Cologuard Q 3 Years  08/28/2002    ZOSTER VACCINE AGE 60>  06/28/2012    GLAUCOMA SCREENING Q2Y  08/28/2017    Bone Densitometry (Dexa) Screening  08/28/2017    Pneumococcal 65+ High/Highest Risk (1 of 2 - PCV13) 08/28/2017    Influenza Age 9 to Adult  08/01/2018    MEDICARE YEARLY EXAM  06/06/2019    BREAST CANCER SCRN MAMMOGRAM  02/08/2020    Hepatitis C Screening  Completed

## 2018-06-14 DIAGNOSIS — I10 ESSENTIAL HYPERTENSION: ICD-10-CM

## 2018-06-14 RX ORDER — LISINOPRIL AND HYDROCHLOROTHIAZIDE 10; 12.5 MG/1; MG/1
1 TABLET ORAL DAILY
Qty: 90 TAB | Refills: 1 | Status: SHIPPED | OUTPATIENT
Start: 2018-06-14 | End: 2019-01-10 | Stop reason: SDUPTHER

## 2018-07-09 RX ORDER — LEVOTHYROXINE SODIUM 75 UG/1
TABLET ORAL
Qty: 30 TAB | Refills: 1 | Status: SHIPPED | OUTPATIENT
Start: 2018-07-09 | End: 2018-09-14 | Stop reason: SDUPTHER

## 2018-12-18 RX ORDER — LEVOTHYROXINE SODIUM 75 UG/1
TABLET ORAL
Qty: 90 TAB | Refills: 0 | Status: SHIPPED | OUTPATIENT
Start: 2018-12-18 | End: 2019-03-18 | Stop reason: SDUPTHER

## 2019-01-10 DIAGNOSIS — I10 ESSENTIAL HYPERTENSION: ICD-10-CM

## 2019-01-14 RX ORDER — LISINOPRIL AND HYDROCHLOROTHIAZIDE 10; 12.5 MG/1; MG/1
1 TABLET ORAL DAILY
Qty: 90 TAB | Refills: 0 | Status: SHIPPED | OUTPATIENT
Start: 2019-01-14 | End: 2019-04-15 | Stop reason: SDUPTHER

## 2019-01-14 RX ORDER — LISINOPRIL AND HYDROCHLOROTHIAZIDE 10; 12.5 MG/1; MG/1
TABLET ORAL
Qty: 90 TAB | Refills: 1 | Status: SHIPPED | OUTPATIENT
Start: 2019-01-14 | End: 2019-07-24 | Stop reason: SDUPTHER

## 2019-03-15 ENCOUNTER — HOSPITAL ENCOUNTER (OUTPATIENT)
Dept: MAMMOGRAPHY | Age: 69
Discharge: HOME | End: 2019-03-15
Attending: INTERNAL MEDICINE
Payer: MEDICARE

## 2019-03-15 DIAGNOSIS — Z12.39 SCREENING BREAST EXAMINATION: ICD-10-CM

## 2019-03-15 PROCEDURE — 77067 SCR MAMMO BI INCL CAD: CPT

## 2019-03-18 RX ORDER — LEVOTHYROXINE SODIUM 75 UG/1
TABLET ORAL
Qty: 90 TAB | Refills: 0 | Status: SHIPPED | OUTPATIENT
Start: 2019-03-18 | End: 2019-07-10 | Stop reason: SDUPTHER

## 2019-04-15 DIAGNOSIS — I10 ESSENTIAL HYPERTENSION: ICD-10-CM

## 2019-04-15 RX ORDER — LISINOPRIL AND HYDROCHLOROTHIAZIDE 10; 12.5 MG/1; MG/1
1 TABLET ORAL DAILY
Qty: 90 TAB | Refills: 0 | Status: SHIPPED | OUTPATIENT
Start: 2019-04-15 | End: 2019-07-24 | Stop reason: SDUPTHER

## 2019-07-03 ENCOUNTER — TELEPHONE (OUTPATIENT)
Dept: INTERNAL MEDICINE CLINIC | Age: 69
End: 2019-07-03

## 2019-07-07 RX ORDER — LEVOTHYROXINE SODIUM 75 UG/1
TABLET ORAL
Qty: 90 TAB | Refills: 0 | OUTPATIENT
Start: 2019-07-07

## 2019-07-09 ENCOUNTER — TELEPHONE (OUTPATIENT)
Dept: INTERNAL MEDICINE CLINIC | Age: 69
End: 2019-07-09

## 2019-07-09 NOTE — TELEPHONE ENCOUNTER
Pt called to check the status of Levothyroxine Rx- I let her know that it had been refused refused and that she needed to schedule appt. Pt says that she would love to come in but she cant due to car trouble and asked that we send Rx to [facility] asap.

## 2019-07-10 RX ORDER — LEVOTHYROXINE SODIUM 75 UG/1
TABLET ORAL
Qty: 15 TAB | Refills: 0 | Status: SHIPPED | OUTPATIENT
Start: 2019-07-10 | End: 2019-07-24 | Stop reason: SDUPTHER

## 2019-07-10 NOTE — TELEPHONE ENCOUNTER
Received return call from pt, scheduled for appointment with Precious Tesfaye 7/24/2019 for medication refill and temporary refill request would be sent to pharmacy x 1 and if pt misses appointment office can not refill, pt voiced understanding

## 2019-07-24 ENCOUNTER — OFFICE VISIT (OUTPATIENT)
Dept: INTERNAL MEDICINE CLINIC | Age: 69
End: 2019-07-24

## 2019-07-24 VITALS
BODY MASS INDEX: 31.79 KG/M2 | HEIGHT: 61 IN | WEIGHT: 168.4 LBS | OXYGEN SATURATION: 99 % | HEART RATE: 75 BPM | SYSTOLIC BLOOD PRESSURE: 138 MMHG | DIASTOLIC BLOOD PRESSURE: 82 MMHG | RESPIRATION RATE: 22 BRPM | TEMPERATURE: 97.8 F

## 2019-07-24 DIAGNOSIS — Z13.220 LIPID SCREENING: ICD-10-CM

## 2019-07-24 DIAGNOSIS — I10 ESSENTIAL HYPERTENSION: Primary | ICD-10-CM

## 2019-07-24 DIAGNOSIS — Z00.00 MEDICARE ANNUAL WELLNESS VISIT, SUBSEQUENT: ICD-10-CM

## 2019-07-24 DIAGNOSIS — E03.9 ACQUIRED HYPOTHYROIDISM: ICD-10-CM

## 2019-07-24 DIAGNOSIS — J30.9 ALLERGIC RHINITIS, UNSPECIFIED SEASONALITY, UNSPECIFIED TRIGGER: ICD-10-CM

## 2019-07-24 DIAGNOSIS — Z23 ENCOUNTER FOR IMMUNIZATION: ICD-10-CM

## 2019-07-24 DIAGNOSIS — E55.9 VITAMIN D DEFICIENCY: ICD-10-CM

## 2019-07-24 RX ORDER — FLUTICASONE PROPIONATE 50 MCG
1 SPRAY, SUSPENSION (ML) NASAL 2 TIMES DAILY
Qty: 1 BOTTLE | Refills: 1 | Status: SHIPPED | OUTPATIENT
Start: 2019-07-24 | End: 2022-04-28 | Stop reason: ALTCHOICE

## 2019-07-24 RX ORDER — LEVOTHYROXINE SODIUM 75 UG/1
TABLET ORAL
Qty: 90 TAB | Refills: 1 | Status: SHIPPED | OUTPATIENT
Start: 2019-07-24 | End: 2020-03-26

## 2019-07-24 RX ORDER — LORATADINE 10 MG/1
10 TABLET ORAL
Qty: 30 TAB | Refills: 1 | Status: SHIPPED | OUTPATIENT
Start: 2019-07-24

## 2019-07-24 RX ORDER — LISINOPRIL AND HYDROCHLOROTHIAZIDE 10; 12.5 MG/1; MG/1
TABLET ORAL
Qty: 90 TAB | Refills: 1 | Status: SHIPPED | OUTPATIENT
Start: 2019-07-24 | End: 2020-03-26 | Stop reason: SDUPTHER

## 2019-07-24 NOTE — PROGRESS NOTES
HISTORY OF PRESENT ILLNESS  Jaswinder Wills is a 79 y.o. female. Patient presents today for follow-up visit after one year and Medicare Wellness Visit. Patient's history includes hypothyroidism. She is taking levothyroxine 75 mcg po daily and requests refill. History also includes hypertension. She requests refill of lisinopril-HCTZ 10-12.5 mg po daily. The patient has complaints of cough at time of visit. She has experienced cough, rhinorrhea, and post-nasal drip for several months. No chest pain or shortness of breath. No fever or chills  She has taken OTC Robitussin and Mucinex without relief. Visit Vitals  /82 (BP 1 Location: Right arm, BP Patient Position: Sitting)   Pulse 75   Temp 97.8 °F (36.6 °C) (Oral)   Resp 22   Ht 5' 1\" (1.549 m)   Wt 168 lb 6.4 oz (76.4 kg)   SpO2 99%   BMI 31.82 kg/m²       HPI    Review of Systems   Constitutional: Negative for chills and fever. HENT: Positive for congestion. Respiratory: Positive for cough. Negative for shortness of breath and wheezing. Cardiovascular: Negative for chest pain, palpitations and leg swelling. Gastrointestinal: Negative for abdominal pain. Genitourinary: Negative. Musculoskeletal: Negative. Skin: Negative. Neurological: Negative for headaches. Endo/Heme/Allergies: Negative. Psychiatric/Behavioral: Negative. Physical Exam   Constitutional: She is oriented to person, place, and time. She appears well-developed and well-nourished. HENT:   Head: Normocephalic and atraumatic. Mild nasal congestion  OP with cobblestoning   Eyes: Conjunctivae are normal.   Neck: Neck supple. Cardiovascular: Normal rate and regular rhythm. Pulmonary/Chest: Effort normal and breath sounds normal. No respiratory distress. She has no wheezes. She has no rales. Abdominal: Soft. Bowel sounds are normal. She exhibits no distension. There is no tenderness. Musculoskeletal: She exhibits no edema.    Lymphadenopathy:     She has no cervical adenopathy. Neurological: She is alert and oriented to person, place, and time. Skin: Skin is warm and dry. Psychiatric: She has a normal mood and affect. Nursing note and vitals reviewed. ASSESSMENT and PLAN  Encounter Diagnoses   Name Primary?  Essential hypertension Yes    Acquired hypothyroidism     Lipid screening     Vitamin D deficiency     Encounter for immunization     Medicare annual wellness visit, subsequent     Allergic rhinitis, unspecified seasonality, unspecified trigger      Will order   Claritin 10 mg po daily PRN congestion  Flonase 1 spray to each nostril bid    Will refill:  Levothyroxine 75 mcg po daily  Lisinopril-HCTZ 10-12.5 mg po daily    Will order  CBC WITH AUTOMATED DIFF   METABOLIC PANEL, COMPREHENSIVE   TSH 3RD GENERATION   VITAMIN D, 25 HYDROXY   LIPID PANEL     In office,    Pneumococcal polysaccharide vaccine, 23-valent, adult or immunosuppressed pt dose       Lab results and schedule of future lab studies reviewed with patient  Reviewed medications and side effects in detail   Patient encouraged to call or return to office if symptoms do not improve or worsen. Reviewed plan of care with patient who acknowledges understanding and agrees. Follow-up in 6 months, or sooner as needed. This is the Subsequent Medicare Annual Wellness Exam, performed 12 months or more after the Initial AWV or the last Subsequent AWV    I have reviewed the patient's medical history in detail and updated the computerized patient record.      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[place])    Hypertension     S/P radiation therapy 2012    also chemo      Past Surgical History:   Procedure Laterality Date    BREAST SURGERY PROCEDURE UNLISTED      Left Breast Biopsy on 09/02/11    HX BREAST LUMPECTOMY  4/26/12    LEFT breast lumpectomy with axillary node dissection     HX TUBAL LIGATION      HX VASCULAR ACCESS      portacath  X2     Current Outpatient Medications   Medication Sig Dispense Refill    levothyroxine (SYNTHROID) 75 mcg tablet TAKE 1 TABLET BY MOUTH BEFORE BREAKFAST 90 Tab 1    lisinopril-hydroCHLOROthiazide (PRINZIDE, ZESTORETIC) 10-12.5 mg per tablet TAKE 1 TABLET BY MOUTH ONCE DAILY 90 Tab 1    fluticasone propionate (FLONASE) 50 mcg/actuation nasal spray 1 [place] by Both Nostrils route two (2) times a day. 1 Bottle 1    loratadine (CLARITIN) 10 mg tablet Take 1 Tab by mouth daily as needed for Allergies. 30 Tab 1    ibuprofen (MOTRIN) 200 mg tablet Take 200 mg by mouth every six (6) hours as needed.  aspirin delayed-release (ASPIR-81) 81 mg tablet Take  by mouth daily.        No Known Allergies  Family History   Problem Relation Age of Onset    Breast Cancer Mother 48    Cancer Mother 48        breast    Heart Disease Father         MI    Diabetes Father     Diabetes Paternal Aunt     Diabetes Paternal Aunt      Social History     Tobacco Use    Smoking status: Never Smoker    Smokeless tobacco: Never Used   Substance Use Topics    Alcohol use: Yes     Comment: rare     Patient Active Problem List   Diagnosis Code    HTN (hypertension) I10    Breast cancer, stage 2 ([facility] [address] C50.919    Lymphedema of arm I89.0    Arthritis M19.90    Right shoulder pain M25.511    S/P breast lumpectomy Z98.890    Vitamin D deficiency E55.9    Insomnia G47.00    Skin lesion of breast N64.9    Advanced care planning/counseling discussion Z71.89    Acquired hypothyroidism E03.9       Depression Risk Factor Screening:     3 most recent PHQ Screens 7/24/2019   Little interest or pleasure in doing things Not at all   Feeling down, depressed, irritable, or hopeless Not at all   Total Score PHQ 2 0   Trouble falling or staying asleep, or sleeping too much -   Feeling tired or having little energy -   Poor appetite, weight loss, or overeating -   Feeling bad about yourself - or that you are a failure or have let yourself or your family down -   Trouble concentrating on things such as school, work, reading, or watching TV -   Moving or speaking so slowly that other people could have noticed; or the opposite being so fidgety that others notice -   Thoughts of being better off dead, or hurting yourself in some way -   PHQ 9 Score -   How difficult have these problems made it for you to do your work, take care of your home and get along with others -     Alcohol Risk Factor Screening: You do not drink alcohol or very rarely. Functional Ability and Level of Safety:   Hearing Loss  Hearing is good. Activities of Daily Living  The home contains: no safety equipment. Patient does total self care    Fall Risk  Fall Risk Assessment, last 12 mths 7/24/2019   Able to walk? Yes   Fall in past 12 months? Yes   Fall with injury? No   Number of falls in past 12 months 1   Fall Risk Score 1       Abuse Screen  Patient is not abused    Cognitive Screening   Evaluation of Cognitive Function:  Has your family/caregiver stated any concerns about your memory: no  Normal    Patient Care Team   Patient Care Team:  Austin Beal NP as PCP - General (Nurse Practitioner)  Nan Kumar MD as Physician (Radiation Oncology)  Nuria Alberto DO as Physician (Oncology)  Lucy Camargo MD as Physician (Breast Surgery)  Carolyn Ruelas NP (Nurse Practitioner)    Discussed [place]'s living will form and provided copy at time of today's visit for patient's review.

## 2019-07-24 NOTE — PROGRESS NOTES
Health Maintenance Due   Topic Date Due    DTaP/Tdap/Td series (1 - Tdap) 08/28/1973    Cologuard Q 3 Years  08/28/2002    Shingrix Vaccine Age 50> (1 of 2) 08/28/2002    Bone Densitometry (Dexa) Screening  08/28/2017    Pneumococcal 65+ years (2 of 2 - PPSV23) 06/05/2019    MEDICARE YEARLY EXAM  06/06/2019       Chief Complaint   Patient presents with    Medication Refill    Medication Evaluation    Cough       1. Have you been to the ER, urgent care clinic since your last visit? Hospitalized since your last visit? No    2. Have you seen or consulted any other health care providers outside of the [address] [place] [address] since your last visit? Include any pap smears or colon screening. No    3) Do you have an Advance Directive on file? no    4) Are you interested in receiving information on Advance Directives? NO      Patient is accompanied by self I have received verbal consent from Lm Mcneil to discuss any/all medical information while they are present in the room. Lm Mcneil is a 79 y.o. female  who presents for routine immunizations. She denies any symptoms , reactions or allergies that would exclude them from being immunized today. Risks and adverse reactions were discussed and the VIS was given to them. All questions were addressed. She was observed for 10 min post injection. There were no reactions observed.     Constantino Mckinley LPN

## 2019-07-24 NOTE — PATIENT INSTRUCTIONS
Vaccine Information Statement    Pneumococcal Polysaccharide Vaccine: What You Need to Know    Many Vaccine Information Statements are available in Yoruba and other languages. See www.immunize.org/vis. Hojas de información Sobre Vacunas están disponibles en español y en muchos otros idiomas. Visite Guille.si. 1. Why get vaccinated? Vaccination can protect older adults (and some children and younger adults) from pneumococcal disease. Pneumococcal disease is caused by bacteria that can spread from person to person through close contact. It can cause ear infections, and it can also lead to more serious infections of the:   Lungs (pneumonia),   Blood (bacteremia), and   Covering of the brain and spinal cord (meningitis). Meningitis can cause deafness and brain damage, and it can be fatal.      Anyone can get pneumococcal disease, but children under 3years of age, people with certain medical conditions, adults over 72years of age, and cigarette smokers are at the highest risk. About 18,000 older adults die each year from pneumococcal disease in the [place]. Treatment of pneumococcal infections with penicillin and other drugs used to be more effective. But some strains of the disease have become resistant to these drugs. This makes prevention of the disease, through vaccination, even more important. 2. Pneumococcal polysaccharide vaccine (PPSV23)    Pneumococcal polysaccharide vaccine (PPSV23) protects against 23 types of pneumococcal bacteria. It will not prevent all pneumococcal disease. PPSV23 is recommended for:   All adults 72years of age and older,   Anyone 2 through 59years of age with certain long-term health problems,   Anyone 2 through 59years of age with a weakened immune system,   Adults 23 through 59years of age who smoke cigarettes or have asthma. Most people need only one dose of PPSV.   A second dose is recommended for certain high-risk groups. People 72 and older should get a dose even if they have gotten one or more doses of the vaccine before they turned 65. Your healthcare provider can give you more information about these recommendations. Most healthy adults develop protection within 2 to 3 weeks of getting the shot. 3. Some people should not get this vaccine     Anyone who has had a life-threatening allergic reaction to PPSV should not get another dose.  Anyone who has a severe allergy to any component of PPSV should not receive it. Tell your provider if you have any severe allergies.  Anyone who is moderately or severely ill when the shot is scheduled may be asked to wait until they recover before getting the vaccine. Someone with a mild illness can usually be vaccinated.  Children less than 3years of age should not receive this vaccine.  There is no evidence that PPSV is harmful to either a pregnant woman or to her fetus. However, as a precaution, women who need the vaccine should be vaccinated before becoming pregnant, if possible. 4. Risks of a vaccine reaction    With any medicine, including vaccines, there is a chance of side effects. These are usually mild and go away on their own, but serious reactions are also possible. About half of people who get PPSV have mild side effects, such as redness or pain where the shot is given, which go away within about two days. Less than 1 out of 100 people develop a fever, muscle aches, or more severe local reactions. Problems that could happen after any vaccine:     People sometimes faint after a medical procedure, including vaccination. Sitting or lying down for about 15 minutes can help prevent fainting, and injuries caused by a fall. Tell your doctor if you feel dizzy, or have vision changes or ringing in the ears.  Some people get severe pain in the shoulder and have difficulty moving the arm where a shot was given.  This happens very rarely.  Any medication can cause a severe allergic reaction. Such reactions from a vaccine are very rare, estimated at about 1 in a million doses, and would happen within a few minutes to a few hours after the vaccination. As with any medicine, there is a very remote chance of a vaccine causing a serious injury or death. The safety of vaccines is always being monitored. For more information, visit: www.cdc.gov/vaccinesafety/     5. What if there is a serious reaction? What should I look for? Look for anything that concerns you, such as signs of a severe allergic reaction, very high fever, or unusual behavior. Signs of a severe allergic reaction can include hives, swelling of the face and throat, difficulty breathing, a fast heartbeat, dizziness, and weakness. These would usually start a few minutes to a few hours after the vaccination. What should I do? If you think it is a severe allergic reaction or other emergency that cant wait, call 9-1-1 or get to the nearest hospital. Otherwise, call your doctor. Afterward, the reaction should be reported to the Vaccine Adverse Event Reporting System (VAERS). Your doctor might file this report, or you can do it yourself through the VAERS web site at www.vaers. [facility].gov, or by calling 9-686.881.1744. [employer] does not give medical advice. 6. How can I learn more?  Ask your doctor. He or she can give you the vaccine package insert or suggest other sources of information.  Call your local or state health department.    Contact the Centers for Disease Control and Prevention (CDC):  - Call 0-912.713.7567 (1-318-ZMU-INFO) or  - Visit CDCs website at Digistrive 18 04/24/2015    [facility] of Health and [facility] [place] for Disease Control and Prevention    Office Use Only    Medicare Wellness Visit, Female     The best way to live healthy is to have a lifestyle where you eat a well-balanced diet, exercise regularly, limit alcohol use, and quit all forms of tobacco/nicotine, if applicable. Regular preventive services are another way to keep healthy. Preventive services (vaccines, screening tests, monitoring & exams) can help personalize your care plan, which helps you manage your own care. Screening tests can find health problems at the earliest stages, when they are easiest to treat. [facility] follows the current, evidence-based guidelines published by the [facility] Sudhakar Shay (USPSTF) when recommending preventive services for our patients. Because we follow these guidelines, sometimes recommendations change over time as research supports it. (For example, mammograms used to be recommended annually. Even though Medicare will still pay for an annual mammogram, the newer guidelines recommend a mammogram every two years for women of average risk.)  Of course, you and your doctor may decide to screen more often for some diseases, based on your risk and your health status. Preventive services for you include:  - Medicare offers their members a free annual wellness visit, which is time for you and your primary care provider to discuss and plan for your preventive service needs. Take advantage of this benefit every year!  -All adults over the age of 72 should receive the recommended pneumonia vaccines. Current USPSTF guidelines recommend a series of two vaccines for the best pneumonia protection.   -All adults should have a flu vaccine yearly and a tetanus vaccine every 10 years. All adults age 61 and older should receive a shingles vaccine once in their lifetime.    -A bone mass density test is recommended when a woman turns 65 to screen for osteoporosis. This test is only recommended one time, as a screening. Some providers will use this same test as a disease monitoring tool if you already have osteoporosis.   -All adults age 38-68 who are overweight should have a diabetes screening test once every three years.   -Other screening tests and preventive services for persons with diabetes include: an eye exam to screen for diabetic retinopathy, a kidney function test, a foot exam, and stricter control over your cholesterol.   -Cardiovascular screening for adults with routine risk involves an electrocardiogram (ECG) at intervals determined by your doctor.   -Colorectal cancer screenings should be done for adults age 54-65 with no increased risk factors for colorectal cancer. There are a number of acceptable methods of screening for this type of cancer. Each test has its own benefits and drawbacks. Discuss with your doctor what is most appropriate for you during your annual wellness visit. The different tests include: colonoscopy (considered the best screening method), a fecal occult blood test, a fecal DNA test, and sigmoidoscopy. -Breast cancer screenings are recommended every other year for women of normal risk, age 54-69.  -Cervical cancer screenings for women over age 72 are only recommended with certain risk factors.   -All adults born between [facility] should be screened once for Hepatitis C. Here is a list of your current Health Maintenance items (your personalized list of preventive services) with a due date: There are no preventive care reminders to display for this patient.

## 2019-08-23 ENCOUNTER — TELEPHONE (OUTPATIENT)
Dept: ONCOLOGY | Age: 69
End: 2019-08-23

## 2019-08-23 NOTE — TELEPHONE ENCOUNTER
Patient called and stated that she had a mammogram last year and is paying a $75 copay for it. Patient stated that this year when she had the mammogram she does not have a copay. Patient stated that she would like to know why this is. Patient stated that she called the billing office and they said that she needed to call our office because they can not do anything. CB# [phone] ok to leave voicemail.     2018  mammographery  ($75)     2019  mammo screaning

## 2019-08-28 NOTE — TELEPHONE ENCOUNTER
F/U call to patient, states [facility] who referred her to [facility] billing dept - who she states told her the MD office would have to fix. Asked patient to contact [facility] and determine what the difference is between last year being charged and this year not being charged for her mammogram.  Validated her concerns, and she understands that insurance companies change allowables/deductibles/copays on a regular basis.

## 2020-03-26 DIAGNOSIS — I10 ESSENTIAL HYPERTENSION: ICD-10-CM

## 2020-03-26 RX ORDER — LEVOTHYROXINE SODIUM 75 UG/1
TABLET ORAL
Qty: 90 TAB | Refills: 0 | Status: SHIPPED | OUTPATIENT
Start: 2020-03-26 | End: 2021-02-09 | Stop reason: SDUPTHER

## 2020-03-26 NOTE — TELEPHONE ENCOUNTER
Requested Prescriptions     Pending Prescriptions Disp Refills    levothyroxine (SYNTHROID) 75 mcg tablet 90 Tab 1     Sig: TAKE 1 TABLET BY MOUTH BEFORE BREAKFAST    lisinopril-hydroCHLOROthiazide (PRINZIDE, ZESTORETIC) 10-12.5 mg per tablet 90 Tab 1     Sig: TAKE 1 TABLET BY MOUTH ONCE DAILY

## 2020-03-27 RX ORDER — LEVOTHYROXINE SODIUM 75 UG/1
TABLET ORAL
Qty: 90 TAB | Refills: 1 | Status: SHIPPED | OUTPATIENT
Start: 2020-03-27 | End: 2021-02-09 | Stop reason: SDUPTHER

## 2020-03-27 RX ORDER — LISINOPRIL AND HYDROCHLOROTHIAZIDE 10; 12.5 MG/1; MG/1
TABLET ORAL
Qty: 90 TAB | Refills: 1 | Status: SHIPPED | OUTPATIENT
Start: 2020-03-27 | End: 2021-02-09 | Stop reason: SDUPTHER

## 2020-07-22 ENCOUNTER — HOSPITAL ENCOUNTER (OUTPATIENT)
Dept: MAMMOGRAPHY | Age: 70
Discharge: HOME | End: 2020-07-22
Attending: INTERNAL MEDICINE
Payer: MEDICARE

## 2020-07-22 DIAGNOSIS — Z12.31 VISIT FOR SCREENING MAMMOGRAM: ICD-10-CM

## 2020-07-22 PROCEDURE — 77067 SCR MAMMO BI INCL CAD: CPT

## 2021-02-09 ENCOUNTER — VIRTUAL VISIT (OUTPATIENT)
Dept: INTERNAL MEDICINE CLINIC | Age: 71
End: 2021-02-09
Payer: MEDICARE

## 2021-02-09 DIAGNOSIS — E55.9 VITAMIN D DEFICIENCY: ICD-10-CM

## 2021-02-09 DIAGNOSIS — Z00.00 MEDICARE ANNUAL WELLNESS VISIT, SUBSEQUENT: ICD-10-CM

## 2021-02-09 DIAGNOSIS — E03.9 ACQUIRED HYPOTHYROIDISM: ICD-10-CM

## 2021-02-09 DIAGNOSIS — Z00.00 MEDICARE ANNUAL WELLNESS VISIT, SUBSEQUENT: Primary | ICD-10-CM

## 2021-02-09 DIAGNOSIS — I10 ESSENTIAL HYPERTENSION: ICD-10-CM

## 2021-02-09 PROCEDURE — 1101F PT FALLS ASSESS-DOCD LE1/YR: CPT | Performed by: INTERNAL MEDICINE

## 2021-02-09 PROCEDURE — G8421 BMI NOT CALCULATED: HCPCS | Performed by: INTERNAL MEDICINE

## 2021-02-09 PROCEDURE — G8536 NO DOC ELDER MAL SCRN: HCPCS | Performed by: INTERNAL MEDICINE

## 2021-02-09 PROCEDURE — G8428 CUR MEDS NOT DOCUMENT: HCPCS | Performed by: INTERNAL MEDICINE

## 2021-02-09 PROCEDURE — G8756 NO BP MEASURE DOC: HCPCS | Performed by: INTERNAL MEDICINE

## 2021-02-09 PROCEDURE — G8400 PT W/DXA NO RESULTS DOC: HCPCS | Performed by: INTERNAL MEDICINE

## 2021-02-09 PROCEDURE — 3017F COLORECTAL CA SCREEN DOC REV: CPT | Performed by: INTERNAL MEDICINE

## 2021-02-09 PROCEDURE — G8432 DEP SCR NOT DOC, RNG: HCPCS | Performed by: INTERNAL MEDICINE

## 2021-02-09 PROCEDURE — G9899 SCRN MAM PERF RSLTS DOC: HCPCS | Performed by: INTERNAL MEDICINE

## 2021-02-09 PROCEDURE — G0439 PPPS, SUBSEQ VISIT: HCPCS | Performed by: INTERNAL MEDICINE

## 2021-02-09 RX ORDER — LEVOTHYROXINE SODIUM 75 UG/1
TABLET ORAL
Qty: 90 TAB | Refills: 1 | Status: SHIPPED | OUTPATIENT
Start: 2021-02-09 | End: 2021-09-27

## 2021-02-09 RX ORDER — LISINOPRIL AND HYDROCHLOROTHIAZIDE 10; 12.5 MG/1; MG/1
TABLET ORAL
Qty: 90 TAB | Refills: 1 | Status: SHIPPED | OUTPATIENT
Start: 2021-02-09 | End: 2021-09-24 | Stop reason: SDUPTHER

## 2021-02-09 NOTE — PROGRESS NOTES
Lyle Jefferson is a 78 y.o. female who was seen by synchronous (real-time) audio-video technology on 2/9/2021 for Medication Refill        Assessment & Plan:   Diagnoses and all orders for this visit:    1. Medicare annual wellness visit, subsequent  -     METABOLIC PANEL, COMPREHENSIVE; Future  -     CBC WITH AUTOMATED DIFF; Future    2. Essential hypertension  -     LIPID PANEL; Future  -     lisinopril-hydroCHLOROthiazide (PRINZIDE, ZESTORETIC) 10-12.5 mg per tablet; TAKE 1 TABLET BY MOUTH ONCE DAILY    3. Acquired hypothyroidism  -     CBC WITH AUTOMATED DIFF; Future  -     TSH 3RD GENERATION; Future  -     levothyroxine (SYNTHROID) 75 mcg tablet; TAKE 1 TABLET BY MOUTH BEFORE BREAKFAST    4. Vitamin D deficiency  -     VITAMIN D, 25 HYDROXY; Future          Placed lab orders for patient to have done since she did not have them done last time they were ordered. Will come to office to  her lab slips on Friday. Patient scheduled an in office visit in March and she would like the flu shot at this time. Medications sent electronically to [facility] where pt will pick them up  Subjective:   Ms Thiago Rice is with a PMH of HTN, hypothyroidism, arthritis, breast cancer with lumpectomy, vit d deficiency, and insomnia. Patient present today via virtual video for medication refills for levothyroxine and lisinopril- HCTZ. Pt has not been seen in the office since July of 2019. Patient has not received the flu shot this year, but would like at next visit. Pt has not had the Shingrex vaccine and does not wish to have it. Patient has not had baseline colonoscopy and does not want one. Patient did have eye exam within the last year. Pt received both doses of the pneumococcal vaccine   Pt reports she has had a dexa scan, but cannot remember last time. Pt has not been checking her BP's at home but not experiencing any symptoms indicating hyper/hypotension.     Pt is feeling well overall and denies any chest pain, sob, dizziness, fevers or coughing. Prior to Admission medications    Medication Sig Start Date End Date Taking? Authorizing Provider   levothyroxine (SYNTHROID) 75 mcg tablet TAKE 1 TABLET BY MOUTH BEFORE BREAKFAST 2/9/21  Yes Josefa Rossi NP   lisinopril-hydroCHLOROthiazide (PRINZIDE, ZESTORETIC) 10-12.5 mg per tablet TAKE 1 TABLET BY MOUTH ONCE DAILY 2/9/21  Yes Garrison Rossi NP   fluticasone propionate (FLONASE) 50 mcg/actuation nasal spray 1 [place] by Both Nostrils route two (2) times a day. 7/24/19  Yes Honorio Prieto NP   loratadine (CLARITIN) 10 mg tablet Take 1 Tab by mouth daily as needed for Allergies. 7/24/19  Yes Honorio Prieto NP   ibuprofen (MOTRIN) 200 mg tablet Take 200 mg by mouth every six (6) hours as needed. Yes Provider, Historical   aspirin delayed-release (ASPIR-81) 81 mg tablet Take  by mouth daily.    Yes Provider, Historical     Patient Active Problem List   Diagnosis Code    HTN (hypertension) I10    Breast cancer, stage 2 ([facility] C50.919    Lymphedema of arm I89.0    Arthritis M19.90    Right shoulder pain M25.511    S/P breast lumpectomy Z98.890    Vitamin D deficiency E55.9    Insomnia G47.00    Skin lesion of breast N64.9    Advanced care planning/counseling discussion Z71.89    Acquired hypothyroidism E03.9     Patient Active Problem List    Diagnosis Date Noted    Acquired hypothyroidism 06/05/2018    Advanced care planning/counseling discussion 12/04/2015    Skin lesion of breast 11/23/2015    Insomnia 10/20/2014    Vitamin D deficiency 05/06/2014    S/P breast lumpectomy 10/29/2013    Arthritis 12/18/2012    Right shoulder pain 12/18/2012    Lymphedema of arm 07/02/2012    Breast cancer, stage 2 ([facility] 03/02/2012    HTN (hypertension) 09/21/2011     Current Outpatient Medications   Medication Sig Dispense Refill    levothyroxine (SYNTHROID) 75 mcg tablet TAKE 1 TABLET BY MOUTH BEFORE BREAKFAST 90 Tab 1    lisinopril-hydroCHLOROthiazide (PRINZIDE, ZESTORETIC) 10-12.5 mg per tablet TAKE 1 TABLET BY MOUTH ONCE DAILY 90 Tab 1    fluticasone propionate (FLONASE) 50 mcg/actuation nasal spray 1 [place] by Both Nostrils route two (2) times a day. 1 Bottle 1    loratadine (CLARITIN) 10 mg tablet Take 1 Tab by mouth daily as needed for Allergies. 30 Tab 1    ibuprofen (MOTRIN) 200 mg tablet Take 200 mg by mouth every six (6) hours as needed.  aspirin delayed-release (ASPIR-81) 81 mg tablet Take  by mouth daily. No Known Allergies  Past Medical History:   Diagnosis Date    [facility] cancer ([facility] 2012    Left breast Cancer w/ Radiation & Chemo    [facility]     Left breast invasion ductal CA ([place])    Hypertension     S/P radiation therapy 2012    also chemo     Past Surgical History:   Procedure Laterality Date    HX BREAST LUMPECTOMY  4/26/12    LEFT breast lumpectomy with axillary node dissection     HX TUBAL LIGATION      HX VASCULAR ACCESS      portacath  X2    [place] BREAST SURGERY PROCEDURE UNLISTED      Left Breast Biopsy on 09/02/11     Family History   Problem Relation Age of Onset    Breast Cancer Mother 48    Cancer Mother 48        breast    Heart Disease Father         MI    Diabetes Father     Diabetes Paternal Aunt     Diabetes Paternal Aunt      Social History     Tobacco Use    Smoking status: Never Smoker    Smokeless tobacco: Never Used   Substance Use Topics    Alcohol use: Yes     Comment: rare       Review of Systems   Constitutional: Negative. HENT: Negative. Eyes: Negative. Respiratory: Negative. Cardiovascular: Negative. Gastrointestinal: Negative. Genitourinary: Negative. Musculoskeletal: Negative. Skin: Negative. Neurological: Negative. Objective:   No flowsheet data found.      [INSTRUCTIONS:  \"[x]\" Indicates a positive item  \"[]\" Indicates a negative item  -- DELETE ALL ITEMS NOT EXAMINED]    Constitutional: [x] Appears well-developed and well-nourished [x] No apparent distress      [] Abnormal -     Mental status: [x] Alert and awake  [x] Oriented to person/place/time [x] Able to follow commands    [] Abnormal -     Eyes:   EOM    [x]  Normal    [] Abnormal -   Sclera  [x]  Normal    [] Abnormal -          Discharge [x]  None visible   [] Abnormal -     HENT: [x] Normocephalic, atraumatic  [] Abnormal -   [x] Mouth/Throat: Mucous membranes are moist    External Ears [x] Normal  [] Abnormal -    Neck: [x] No visualized mass [] Abnormal -     Pulmonary/Chest: [x] Respiratory effort normal   [x] No visualized signs of difficulty breathing or respiratory distress        [] Abnormal -      Musculoskeletal:   [x] Normal gait with no signs of ataxia         [x] Normal range of motion of neck        [] Abnormal -     Neurological:        [x] No Facial Asymmetry (Cranial nerve 7 motor function) (limited exam due to video visit)          [x] No gaze palsy        [] Abnormal -          Skin:        [x] No significant exanthematous lesions or discoloration noted on facial skin         [] Abnormal -            Psychiatric:       [x] Normal Affect [] Abnormal -        [x] No Hallucinations    Other pertinent observable physical exam findings:-        We discussed the expected course, resolution and complications of the diagnosis(es) in detail. Medication risks, benefits, costs, interactions, and alternatives were discussed as indicated. I advised her to contact the office if her condition worsens, changes or fails to improve as anticipated. She expressed understanding with the diagnosis(es) and plan. Marcin Fischer, who was evaluated through a patient-initiated, synchronous (real-time) audio-video encounter, and/or her healthcare decision maker, is aware that it is a billable service, with coverage as determined by her insurance carrier. She provided verbal consent to proceed: Yes, and patient identification was verified.  It was conducted pursuant to the emergency declaration under the [facility], [address] [place] [address] authority and the [employer] and [facility] General Act. A caregiver was present when appropriate. Ability to conduct physical exam was limited. I was at home. The patient was at home. Lio Cantu- DORIAN student  Bonnie Le NP                                                                                                                Consent For Provider Observation  [facility] (\"[facility]") has agreed to permit a provider employed by [facility] (\"Observer\") to observe care to patients treated in its physician practices. Your physician has agreed to permit such Observer to observe his/her patient care activities. Such observation will not include any direct participation in the care provided to you. This writer has obtained verbal permission from this patient to permit Observer to observe their medical care received at [facility]. This patient agrees that they have been given the opportunity to refuse to give such consent and that they may withdraw their consent at any time, except to the extent [facility] has relied on this consent and an Observer has already observed their medical care. This consent shall remain in effect for 1 year, unless otherwise withdrawn by this patient.

## 2021-02-09 NOTE — PROGRESS NOTES
Health Maintenance Due   Topic Date Due    COVID-19 Vaccine (1 of 2) 08/28/1968    DTaP/Tdap/Td series (1 - Tdap) 08/28/1973    Shingrix Vaccine Age 50> (1 of 2) 08/28/2002    Colorectal Cancer Screening Combo  08/28/2002    Bone Densitometry (Dexa) Screening  08/28/2017    GLAUCOMA SCREENING Q2Y  04/17/2020    Medicare Yearly Exam  07/24/2020    Flu Vaccine (1) 09/01/2020       Chief Complaint   Patient presents with    Medication Refill       1. Have you been to the ER, urgent care clinic since your last visit? Hospitalized since your last visit? No    2. Have you seen or consulted any other health care providers outside of the [address] [place] [address] since your last visit? Include any pap smears or colon screening. No    3) Do you have an Advance Directive on file? no    4) Are you interested in receiving information on Advance Directives? NO      Patient is accompanied by self I have received verbal consent from Emi Shane to discuss any/all medical information while they are present in the room.

## 2021-03-06 LAB
25(OH)D3+25(OH)D2 SERPL-MCNC: 13.7 NG/ML (ref 30–100)
ALBUMIN SERPL-MCNC: 4.5 G/DL (ref 3.8–4.8)
ALBUMIN/GLOB SERPL: 1.8 {RATIO} (ref 1.2–2.2)
ALP SERPL-CCNC: 109 IU/L (ref 39–117)
ALT SERPL-CCNC: 8 IU/L (ref 0–32)
AST SERPL-CCNC: 18 IU/L (ref 0–40)
BASOPHILS # BLD AUTO: 0.1 X10E3/UL (ref 0–0.2)
BASOPHILS NFR BLD AUTO: 1 %
BILIRUB SERPL-MCNC: 0.3 MG/DL (ref 0–1.2)
BUN SERPL-MCNC: 32 MG/DL (ref 8–27)
BUN/CREAT SERPL: 18 (ref 12–28)
CALCIUM SERPL-MCNC: 9.2 MG/DL (ref 8.7–10.3)
CHLORIDE SERPL-SCNC: 101 MMOL/L (ref 96–106)
CHOLEST SERPL-MCNC: 183 MG/DL (ref 100–199)
CO2 SERPL-SCNC: 24 MMOL/L (ref 20–29)
CREAT SERPL-MCNC: 1.75 MG/DL (ref 0.57–1)
EOSINOPHIL # BLD AUTO: 0.2 X10E3/UL (ref 0–0.4)
EOSINOPHIL NFR BLD AUTO: 3 %
ERYTHROCYTE [DISTWIDTH] IN BLOOD BY AUTOMATED COUNT: 13.4 % (ref 11.7–15.4)
GLOBULIN SER CALC-MCNC: 2.5 G/DL (ref 1.5–4.5)
GLUCOSE SERPL-MCNC: 83 MG/DL (ref 65–99)
HCT VFR BLD AUTO: 34.4 % (ref 34–46.6)
HDLC SERPL-MCNC: 59 MG/DL
HGB BLD-MCNC: 11.4 G/DL (ref 11.1–15.9)
IMM GRANULOCYTES # BLD AUTO: 0 X10E3/UL (ref 0–0.1)
IMM GRANULOCYTES NFR BLD AUTO: 0 %
IMP & REVIEW OF LAB RESULTS: NORMAL
INTERPRETATION: NORMAL
LDLC SERPL CALC-MCNC: 110 MG/DL (ref 0–99)
LYMPHOCYTES # BLD AUTO: 1.3 X10E3/UL (ref 0.7–3.1)
LYMPHOCYTES NFR BLD AUTO: 24 %
MCH RBC QN AUTO: 27.8 PG (ref 26.6–33)
MCHC RBC AUTO-ENTMCNC: 33.1 G/DL (ref 31.5–35.7)
MCV RBC AUTO: 84 FL (ref 79–97)
MONOCYTES # BLD AUTO: 0.4 X10E3/UL (ref 0.1–0.9)
MONOCYTES NFR BLD AUTO: 7 %
NEUTROPHILS # BLD AUTO: 3.4 X10E3/UL (ref 1.4–7)
NEUTROPHILS NFR BLD AUTO: 65 %
PDF IMAGE, 910387: NORMAL
PLATELET # BLD AUTO: 287 X10E3/UL (ref 150–450)
POTASSIUM SERPL-SCNC: 5.1 MMOL/L (ref 3.5–5.2)
PROT SERPL-MCNC: 7 G/DL (ref 6–8.5)
RBC # BLD AUTO: 4.1 X10E6/UL (ref 3.77–5.28)
SODIUM SERPL-SCNC: 140 MMOL/L (ref 134–144)
TRIGL SERPL-MCNC: 75 MG/DL (ref 0–149)
TSH SERPL DL<=0.005 MIU/L-ACNC: 0.84 UIU/ML (ref 0.45–4.5)
VLDLC SERPL CALC-MCNC: 14 MG/DL (ref 5–40)
WBC # BLD AUTO: 5.4 X10E3/UL (ref 3.4–10.8)

## 2021-03-08 RX ORDER — ERGOCALCIFEROL 1.25 MG/1
50000 CAPSULE ORAL
Qty: 12 CAP | Refills: 0 | Status: SHIPPED | OUTPATIENT
Start: 2021-03-08 | End: 2022-04-28 | Stop reason: ALTCHOICE

## 2021-03-08 NOTE — PROGRESS NOTES
Encourage water as tolerated.  Repeat CMP in 2 weeks   Will send in vitamin d 81086g weekly for the next 12 weeks   All other labs stable

## 2021-03-11 ENCOUNTER — OFFICE VISIT (OUTPATIENT)
Dept: INTERNAL MEDICINE CLINIC | Age: 71
End: 2021-03-11
Payer: MEDICARE

## 2021-03-11 VITALS
WEIGHT: 163 LBS | DIASTOLIC BLOOD PRESSURE: 72 MMHG | HEIGHT: 61 IN | HEART RATE: 74 BPM | BODY MASS INDEX: 30.78 KG/M2 | RESPIRATION RATE: 16 BRPM | SYSTOLIC BLOOD PRESSURE: 134 MMHG | OXYGEN SATURATION: 98 % | TEMPERATURE: 98 F

## 2021-03-11 DIAGNOSIS — E03.9 ACQUIRED HYPOTHYROIDISM: ICD-10-CM

## 2021-03-11 DIAGNOSIS — R79.89 ELEVATED SERUM CREATININE: Primary | ICD-10-CM

## 2021-03-11 DIAGNOSIS — I10 ESSENTIAL HYPERTENSION: ICD-10-CM

## 2021-03-11 DIAGNOSIS — I89.0 LYMPHEDEMA OF ARM: ICD-10-CM

## 2021-03-11 PROCEDURE — 1101F PT FALLS ASSESS-DOCD LE1/YR: CPT | Performed by: INTERNAL MEDICINE

## 2021-03-11 PROCEDURE — 3017F COLORECTAL CA SCREEN DOC REV: CPT | Performed by: INTERNAL MEDICINE

## 2021-03-11 PROCEDURE — G9899 SCRN MAM PERF RSLTS DOC: HCPCS | Performed by: INTERNAL MEDICINE

## 2021-03-11 PROCEDURE — G8400 PT W/DXA NO RESULTS DOC: HCPCS | Performed by: INTERNAL MEDICINE

## 2021-03-11 PROCEDURE — G8754 DIAS BP LESS 90: HCPCS | Performed by: INTERNAL MEDICINE

## 2021-03-11 PROCEDURE — 1090F PRES/ABSN URINE INCON ASSESS: CPT | Performed by: INTERNAL MEDICINE

## 2021-03-11 PROCEDURE — G8752 SYS BP LESS 140: HCPCS | Performed by: INTERNAL MEDICINE

## 2021-03-11 PROCEDURE — G8427 DOCREV CUR MEDS BY ELIG CLIN: HCPCS | Performed by: INTERNAL MEDICINE

## 2021-03-11 PROCEDURE — G8432 DEP SCR NOT DOC, RNG: HCPCS | Performed by: INTERNAL MEDICINE

## 2021-03-11 PROCEDURE — G8417 CALC BMI ABV UP PARAM F/U: HCPCS | Performed by: INTERNAL MEDICINE

## 2021-03-11 PROCEDURE — G8536 NO DOC ELDER MAL SCRN: HCPCS | Performed by: INTERNAL MEDICINE

## 2021-03-11 PROCEDURE — 99214 OFFICE O/P EST MOD 30 MIN: CPT | Performed by: INTERNAL MEDICINE

## 2021-03-11 RX ORDER — DIPHENHYDRAMINE HCL 50 MG
25 CAPSULE ORAL
COMMUNITY

## 2021-03-11 NOTE — PROGRESS NOTES
Department of Anesthesiology  Postprocedure Note    Patient: Awilda Sahu  MRN: [id number]  YOB: 1955  Date of evaluation: 11/28/2020  Time:  11:27 AM     Procedure Summary     Date:  11/28/20 Room / Location:  [address]    Anesthesia Start:  4883 Anesthesia Stop:      Procedure:  CHOLECYSTECTOMY LAPAROSCOPIC WITH CHOLANGIOGRAM (N/A Abdomen) Diagnosis:  (CHOLELITHIASIS)    Surgeon:  Ellie Leos MD Responsible Provider:  John Guerra MD    Anesthesia Type:  general ASA Status:  3 - Emergent          Anesthesia Type: general    Danitza Phase I: Danitza Score: 10    Danitza Phase II:      Last vitals: Reviewed and per EMR flowsheets.        Anesthesia Post Evaluation    Patient location during evaluation: PACU  Patient participation: complete - patient participated  Level of consciousness: awake  Airway patency: patent  Nausea & Vomiting: no nausea and no vomiting  Complications: no  Cardiovascular status: blood pressure returned to baseline  Respiratory status: acceptable  Hydration status: euvolemic Health Maintenance Due   Topic Date Due    COVID-19 Vaccine (1 of 2) Never done    DTaP/Tdap/Td series (1 - Tdap) Never done    Colorectal Cancer Screening Combo  Never done    Flu Vaccine (1) Never done       Chief Complaint   Patient presents with    Hypertension    Allergies        Health Maintenance Due   Topic Date Due    COVID-19 Vaccine (1 of 2) Never done    DTaP/Tdap/Td series (1 - Tdap) Never done    Colorectal Cancer Screening Combo  Never done    Flu Vaccine (1) Never done       Chief Complaint   Patient presents with    Hypertension    Allergies    Nasal Congestion       1. Have you been to the ER, urgent care clinic since your last visit? Hospitalized since your last visit? No    2. Have you seen or consulted any other health care providers outside of the [address] [place] [address] since your last visit? Include any pap smears or colon screening. No    3) Do you have an Advance Directive on file? no    4) Are you interested in receiving information on Advance Directives? NO      Patient is accompanied by self I have received verbal consent from Brent Morales to discuss any/all medical information while they are present in the room.

## 2021-03-11 NOTE — PROGRESS NOTES
HISTORY OF PRESENT ILLNESS  Hemanth Shi is a 78 y.o. female. Ms. Cristhian Aguilar presents to the clinic today for a follow-up appt. She had a virtual appointment last month, and had labs done afterwards. Labs overall normal, except kidney function elevated. She has not had repeat labs done. Overall, she has no concerns. Does not check BP at home regularly. Does wear brace/compression gloves on left hand for lymphedema but due to Madi Vargas, cannot purchase a new one unless pays out of pocket. Works as a  at [employer]. Hypertension   Pertinent negatives include no chest pain, no orthopnea, no palpitations, no headaches, no dizziness and no shortness of breath. Allergies  Pertinent negatives include no chest pain, no headaches and no shortness of breath. Nasal Congestion   Pertinent negatives include no chills, no cough, no shortness of breath, no headaches and no chest pain.      Visit Vitals  /72 (BP 1 Location: Right upper arm, BP Patient Position: Sitting, BP Cuff Size: Adult)   Pulse 74   Temp 98 °F (36.7 °C) (Oral)   Resp 16   Ht 5' 1\" (1.549 m)   Wt 163 lb (73.9 kg)   SpO2 98%   BMI 30.80 kg/m²     Past Medical History:   Diagnosis Date    [facility] cancer ([facility] 2012    Left breast Cancer w/ Radiation & Chemo    Cancer ([facility]     Left breast invasion ductal CA ([place])    Hypertension     S/P radiation therapy 2012    also chemo     Past Surgical History:   Procedure Laterality Date    HX BREAST LUMPECTOMY  4/26/12    LEFT breast lumpectomy with axillary node dissection     HX TUBAL LIGATION      HX VASCULAR ACCESS      portacath  X2    [place] BREAST SURGERY PROCEDURE UNLISTED      Left Breast Biopsy on 09/02/11     Family History   Problem Relation Age of Onset    Breast Cancer Mother 48    Cancer Mother 48        breast    Heart Disease Father         MI    Diabetes Father     Diabetes Paternal Aunt     Diabetes Paternal Aunt      Outpatient Encounter Medications as of 3/11/2021   Medication Sig Dispense Refill    diphenhydrAMINE (Sleeping) 50 mg capsule Take 25 mg by mouth every six (6) hours as needed.  ergocalciferol (ERGOCALCIFEROL) 1,250 mcg (50,000 unit) capsule Take 1 Cap by mouth every seven (7) days. 12 Cap 0    levothyroxine (SYNTHROID) 75 mcg tablet TAKE 1 TABLET BY MOUTH BEFORE BREAKFAST 90 Tab 1    lisinopril-hydroCHLOROthiazide (PRINZIDE, ZESTORETIC) 10-12.5 mg per tablet TAKE 1 TABLET BY MOUTH ONCE DAILY 90 Tab 1    fluticasone propionate (FLONASE) 50 mcg/actuation nasal spray 1 [place] by Both Nostrils route two (2) times a day. 1 Bottle 1    loratadine (CLARITIN) 10 mg tablet Take 1 Tab by mouth daily as needed for Allergies. 30 Tab 1    ibuprofen (MOTRIN) 200 mg tablet Take 200 mg by mouth every six (6) hours as needed.  aspirin delayed-release (ASPIR-81) 81 mg tablet Take  by mouth daily. No facility-administered encounter medications on file as of 3/11/2021. Review of Systems   Constitutional: Negative. Negative for chills and fever. Respiratory: Negative. Negative for cough, shortness of breath and wheezing. Cardiovascular: Negative. Negative for chest pain, palpitations and orthopnea. Gastrointestinal: Negative. Genitourinary: Negative. Neurological: Negative. Negative for dizziness and headaches. Physical Exam  Constitutional:       Appearance: Normal appearance. HENT:      Head: Normocephalic. Neck:      Musculoskeletal: Neck supple. No muscular tenderness. Cardiovascular:      Rate and Rhythm: Normal rate and regular rhythm. Pulses: Normal pulses. Pulmonary:      Effort: Pulmonary effort is normal.   Abdominal:      General: Bowel sounds are normal.   Musculoskeletal: Normal range of motion. Right lower le+ Edema present. Left lower le+ Edema present. Skin:     General: Skin is warm and dry.    Neurological:      Mental Status: She is alert and oriented to person, place, and time. ASSESSMENT and PLAN  Diagnoses and all orders for this visit:    1. Elevated serum creatinine  -     METABOLIC PANEL, COMPREHENSIVE; Future    2. Essential hypertension    3. Acquired hypothyroidism    4. Lymphedema of arm        1) HTN  - Chronic, stable  - Continue on current medications    2) Increased BUN/Cr  - New problem  - Will recheck levels and f/u based on this   - If still elevated, referral to nephrology warranted    3) Hypothyroidism  - Chronic, stable  - Continue on current medication     4) Lymphedema  - Chronic, uncontrolled  - Unfortunately due to ins coverage, unable to purchase new brace/compression glove  - Continue to monitor     ABI Montanez NP                                                                                                                Consent For Provider Observation  [facility] (\"[facility]\") has agreed to permit a provider employed by [employer] (\"Observer\") to observe care to patients treated in its physician practices. Your physician has agreed to permit such Observer to observe his/her patient care activities. Such observation will not include any direct participation in the care provided to you. This writer has obtained verbal permission from this patient to permit Observer to observe their medical care received at [facility]. This patient agrees that they have been given the opportunity to refuse to give such consent and that they may withdraw their consent at any time, except to the extent [employer] has relied on this consent and an Observer has already observed their medical care. This consent shall remain in effect for 1 year, unless otherwise withdrawn by this patient.

## 2021-03-27 LAB
ALBUMIN SERPL-MCNC: 4.3 G/DL (ref 3.8–4.8)
ALBUMIN/GLOB SERPL: 1.7 {RATIO} (ref 1.2–2.2)
ALP SERPL-CCNC: 105 IU/L (ref 39–117)
ALT SERPL-CCNC: 8 IU/L (ref 0–32)
AST SERPL-CCNC: 15 IU/L (ref 0–40)
BILIRUB SERPL-MCNC: 0.3 MG/DL (ref 0–1.2)
BUN SERPL-MCNC: 33 MG/DL (ref 8–27)
BUN/CREAT SERPL: 20 (ref 12–28)
CALCIUM SERPL-MCNC: 8.8 MG/DL (ref 8.7–10.3)
CHLORIDE SERPL-SCNC: 104 MMOL/L (ref 96–106)
CO2 SERPL-SCNC: 24 MMOL/L (ref 20–29)
CREAT SERPL-MCNC: 1.65 MG/DL (ref 0.57–1)
GLOBULIN SER CALC-MCNC: 2.6 G/DL (ref 1.5–4.5)
GLUCOSE SERPL-MCNC: 92 MG/DL (ref 65–99)
INTERPRETATION: NORMAL
POTASSIUM SERPL-SCNC: 5 MMOL/L (ref 3.5–5.2)
PROT SERPL-MCNC: 6.9 G/DL (ref 6–8.5)
SODIUM SERPL-SCNC: 143 MMOL/L (ref 134–144)

## 2021-04-09 ENCOUNTER — TELEPHONE (OUTPATIENT)
Dept: INTERNAL MEDICINE CLINIC | Age: 71
End: 2021-04-09

## 2021-04-09 NOTE — PROGRESS NOTES
Spoke with patient after verifying name and . Notified patient of lab results and recommendation from provider. Patient verbalized understanding and given a chance to ask questions.     Provided contact information for Dr Anthony Sam    Letter mailed to patient with results and recommendations from provider

## 2021-04-09 NOTE — TELEPHONE ENCOUNTER
Spoke with patient after verifying name and . Notified patient of lab results and recommendation from provider. Patient verbalized understanding and given a chance to ask questions.     Provided patient with contact information for Dr Marylynn Hodgkins, patient voiced understanding and appreciation

## 2021-04-12 NOTE — TELEPHONE ENCOUNTER
Mani Gutiérrez [facility] [employer]   Phone Number:  [phone] (Call me)             General Message/Vendor Calls     Caller's first and last name: N/A       Reason for call:New kidney doctors name and phone # at [address] [place] [address]. Callback required yes/no and why: Yes/Confirm       Best contact number(s):[phone]       Details to clarify the request: The kidney doctor that was given to the patient last week does not work at [address] would like Olive Yates to call her back with another doctors name and phone # that works at [address] [place] [address].  If patient does not answer her phone please leave message.        Clayton Yaw

## 2021-06-16 ENCOUNTER — TELEPHONE (OUTPATIENT)
Dept: INTERNAL MEDICINE CLINIC | Age: 71
End: 2021-06-16

## 2021-06-16 NOTE — TELEPHONE ENCOUNTER
Pt needs a referral, demographics, recent labs and visit notes supporting her diagnosis sent to dr Edward Vargas as soon as possible so she can make an appointment  Refer to telephone encounter on 4/12/21

## 2021-08-12 ENCOUNTER — TRANSCRIBE ORDER (OUTPATIENT)
Dept: SCHEDULING | Age: 71
End: 2021-08-12

## 2021-08-12 DIAGNOSIS — Z12.31 VISIT FOR SCREENING MAMMOGRAM: Primary | ICD-10-CM

## 2021-09-03 ENCOUNTER — OFFICE VISIT (OUTPATIENT)
Dept: INTERNAL MEDICINE CLINIC | Age: 71
End: 2021-09-03
Payer: MEDICARE

## 2021-09-03 ENCOUNTER — HOSPITAL ENCOUNTER (OUTPATIENT)
Dept: MAMMOGRAPHY | Age: 71
Discharge: HOME | End: 2021-09-03
Attending: INTERNAL MEDICINE
Payer: MEDICARE

## 2021-09-03 VITALS
OXYGEN SATURATION: 98 % | TEMPERATURE: 98 F | HEIGHT: 61 IN | RESPIRATION RATE: 16 BRPM | HEART RATE: 78 BPM | BODY MASS INDEX: 31.45 KG/M2 | WEIGHT: 166.6 LBS

## 2021-09-03 DIAGNOSIS — M25.462 PAIN AND SWELLING OF LEFT KNEE: Primary | ICD-10-CM

## 2021-09-03 DIAGNOSIS — M25.562 PAIN AND SWELLING OF LEFT KNEE: Primary | ICD-10-CM

## 2021-09-03 DIAGNOSIS — Z12.31 VISIT FOR SCREENING MAMMOGRAM: ICD-10-CM

## 2021-09-03 DIAGNOSIS — I10 ESSENTIAL HYPERTENSION: ICD-10-CM

## 2021-09-03 PROCEDURE — 77067 SCR MAMMO BI INCL CAD: CPT

## 2021-09-03 PROCEDURE — G8400 PT W/DXA NO RESULTS DOC: HCPCS | Performed by: INTERNAL MEDICINE

## 2021-09-03 PROCEDURE — G8427 DOCREV CUR MEDS BY ELIG CLIN: HCPCS | Performed by: INTERNAL MEDICINE

## 2021-09-03 PROCEDURE — 1090F PRES/ABSN URINE INCON ASSESS: CPT | Performed by: INTERNAL MEDICINE

## 2021-09-03 PROCEDURE — G8536 NO DOC ELDER MAL SCRN: HCPCS | Performed by: INTERNAL MEDICINE

## 2021-09-03 PROCEDURE — G8756 NO BP MEASURE DOC: HCPCS | Performed by: INTERNAL MEDICINE

## 2021-09-03 PROCEDURE — G8417 CALC BMI ABV UP PARAM F/U: HCPCS | Performed by: INTERNAL MEDICINE

## 2021-09-03 PROCEDURE — 1101F PT FALLS ASSESS-DOCD LE1/YR: CPT | Performed by: INTERNAL MEDICINE

## 2021-09-03 PROCEDURE — 99213 OFFICE O/P EST LOW 20 MIN: CPT | Performed by: INTERNAL MEDICINE

## 2021-09-03 PROCEDURE — 3017F COLORECTAL CA SCREEN DOC REV: CPT | Performed by: INTERNAL MEDICINE

## 2021-09-03 PROCEDURE — G8432 DEP SCR NOT DOC, RNG: HCPCS | Performed by: INTERNAL MEDICINE

## 2021-09-03 PROCEDURE — G9899 SCRN MAM PERF RSLTS DOC: HCPCS | Performed by: INTERNAL MEDICINE

## 2021-09-03 NOTE — PROGRESS NOTES
HISTORY OF PRESENT ILLNESS  Felicia Peace is a 73 y.o. female. Patient was seen with reports of some tenderness to the left knee for over a month. Just wanted some to check it. Reports that the pain is tolerable. Had a fall over a year ago. Takes OTC as needed. Denies any gait changes, numbness or tingling. No cracking or calf pain. Does not want her BP taken as she was seen by mame earlier today and her BP was slightly high. Has ACE combination is being changes. Is watching her salt intake  Visit Vitals  Pulse 78   Temp 98 °F (36.7 °C) (Oral)   Resp 16   Ht 5' 1\" (1.549 m)   Wt 166 lb 9.6 oz (75.6 kg)   SpO2 98%   BMI 31.48 kg/m²     Past Medical History:   Diagnosis Date    [facility] ([facility] 2012    Left breast Cancer w/ Radiation & Chemo    Cancer ([facility]     Left breast invasion ductal CA ([place])    Hypertension     S/P radiation therapy 2012    also chemo     Past Surgical History:   Procedure Laterality Date    HX BREAST LUMPECTOMY  4/26/12    LEFT breast lumpectomy with axillary node dissection     HX TUBAL LIGATION      HX VASCULAR ACCESS      portacath  X2    [place] BREAST SURGERY PROCEDURE UNLISTED      Left Breast Biopsy on 09/02/11     Family History   Problem Relation Age of Onset    Breast Cancer Mother 48    Cancer Mother 48        breast    Heart Disease Father         MI    Diabetes Father     Diabetes Paternal Aunt     Diabetes Paternal Aunt        HPI    Review of Systems   Constitutional: Negative. Respiratory: Negative. Cardiovascular: Negative. Musculoskeletal: Positive for joint pain. Negative for falls. Neurological: Negative. Physical Exam  Vitals and nursing note reviewed. Cardiovascular:      Rate and Rhythm: Normal rate and regular rhythm. Pulmonary:      Effort: Pulmonary effort is normal.      Breath sounds: Normal breath sounds. Musculoskeletal:      Right knee: Normal.      Left knee: Swelling present. No erythema or crepitus.  No tenderness. Normal alignment. Right lower leg: No edema. Left lower leg: No edema. Skin:     General: Skin is warm. Neurological:      Mental Status: She is alert and oriented to person, place, and time. Motor: No weakness. Psychiatric:         Behavior: Behavior normal.         ASSESSMENT and PLAN  Diagnoses and all orders for this visit:    1. Pain and swelling of left knee      - tylenol PRN. Will elevated and add compression as need. Fall precautions   2.  Essential hypertension      Follow-up and Dispositions    · Return if symptoms worsen or fail to improve, for Follow up.       lab results and schedule of future lab studies reviewed with patient  reviewed diet, exercise and weight control  reviewed medications and side effects in detail

## 2021-09-03 NOTE — PROGRESS NOTES
Identified pt with two pt identifiers(name and ). Reviewed record in preparation for visit and have obtained necessary documentation. Chief Complaint   Patient presents with    Mass     lump on left knee cap x 1 month, intermittent pain        Health Maintenance Due   Topic    COVID-19 Vaccine (1)    DTaP/Tdap/Td series (1 - Tdap)    Colorectal Cancer Screening Combo     Shingrix Vaccine Age 50> (1 of 2)    Breast Cancer Screen Mammogram     Flu Vaccine (1)        Visit Vitals  Pulse 78   Temp 98 °F (36.7 °C) (Oral)   Resp 16   Ht 5' 1\" (1.549 m)   Wt 166 lb 9.6 oz (75.6 kg)   SpO2 98%   BMI 31.48 kg/m²     Pain Scale: 0 - No pain/10    Coordination of Care Questionnaire:  :   1. Have you been to the ER, urgent care clinic since your last visit? Hospitalized since your last visit? No    2. Have you seen or consulted any other health care providers outside of the [address] [place] [address] since your last visit? Include any pap smears or colon screening.  No

## 2021-09-24 DIAGNOSIS — I10 ESSENTIAL HYPERTENSION: ICD-10-CM

## 2021-09-24 RX ORDER — LISINOPRIL AND HYDROCHLOROTHIAZIDE 10; 12.5 MG/1; MG/1
TABLET ORAL
Qty: 90 TABLET | Refills: 1 | Status: SHIPPED | OUTPATIENT
Start: 2021-09-24 | End: 2022-04-28 | Stop reason: ALTCHOICE

## 2021-09-24 NOTE — TELEPHONE ENCOUNTER
Requested Prescriptions     Pending Prescriptions Disp Refills    lisinopril-hydroCHLOROthiazide (PRINZIDE, ZESTORETIC) 10-12.5 mg per tablet 90 Tablet 1     Sig: TAKE 1 TABLET BY MOUTH ONCE DAILY     09/03/2021. She was listed as a  patient but has never seen her. She has only seen navid or Masha Sewell on file  Patient would also like her last labs mailed to her.

## 2021-09-25 DIAGNOSIS — E03.9 ACQUIRED HYPOTHYROIDISM: ICD-10-CM

## 2021-09-27 RX ORDER — LEVOTHYROXINE SODIUM 75 UG/1
TABLET ORAL
Qty: 90 TABLET | Refills: 0 | Status: SHIPPED | OUTPATIENT
Start: 2021-09-27 | End: 2021-12-06

## 2021-12-04 DIAGNOSIS — E03.9 ACQUIRED HYPOTHYROIDISM: ICD-10-CM

## 2021-12-06 RX ORDER — LEVOTHYROXINE SODIUM 75 UG/1
TABLET ORAL
Qty: 90 TABLET | Refills: 0 | Status: SHIPPED | OUTPATIENT
Start: 2021-12-06 | End: 2022-04-28 | Stop reason: SDUPTHER

## 2022-03-19 PROBLEM — E03.9 ACQUIRED HYPOTHYROIDISM: Status: ACTIVE | Noted: 2018-06-05

## 2022-04-28 ENCOUNTER — VIRTUAL VISIT (OUTPATIENT)
Dept: INTERNAL MEDICINE CLINIC | Age: 72
End: 2022-04-28
Payer: MEDICARE

## 2022-04-28 DIAGNOSIS — I10 HYPERTENSION, UNSPECIFIED TYPE: ICD-10-CM

## 2022-04-28 DIAGNOSIS — M19.90 ARTHRITIS: ICD-10-CM

## 2022-04-28 DIAGNOSIS — Z00.00 MEDICARE ANNUAL WELLNESS VISIT, SUBSEQUENT: Primary | ICD-10-CM

## 2022-04-28 DIAGNOSIS — C50.919 MALIGNANT NEOPLASM OF BREAST, STAGE 2, UNSPECIFIED LATERALITY (HCC): ICD-10-CM

## 2022-04-28 DIAGNOSIS — E03.9 ACQUIRED HYPOTHYROIDISM: ICD-10-CM

## 2022-04-28 PROCEDURE — G8756 NO BP MEASURE DOC: HCPCS | Performed by: INTERNAL MEDICINE

## 2022-04-28 PROCEDURE — G9899 SCRN MAM PERF RSLTS DOC: HCPCS | Performed by: INTERNAL MEDICINE

## 2022-04-28 PROCEDURE — G0439 PPPS, SUBSEQ VISIT: HCPCS | Performed by: INTERNAL MEDICINE

## 2022-04-28 PROCEDURE — 3017F COLORECTAL CA SCREEN DOC REV: CPT | Performed by: INTERNAL MEDICINE

## 2022-04-28 PROCEDURE — G8536 NO DOC ELDER MAL SCRN: HCPCS | Performed by: INTERNAL MEDICINE

## 2022-04-28 PROCEDURE — 1101F PT FALLS ASSESS-DOCD LE1/YR: CPT | Performed by: INTERNAL MEDICINE

## 2022-04-28 PROCEDURE — G8432 DEP SCR NOT DOC, RNG: HCPCS | Performed by: INTERNAL MEDICINE

## 2022-04-28 PROCEDURE — G8417 CALC BMI ABV UP PARAM F/U: HCPCS | Performed by: INTERNAL MEDICINE

## 2022-04-28 PROCEDURE — G8427 DOCREV CUR MEDS BY ELIG CLIN: HCPCS | Performed by: INTERNAL MEDICINE

## 2022-04-28 PROCEDURE — G8400 PT W/DXA NO RESULTS DOC: HCPCS | Performed by: INTERNAL MEDICINE

## 2022-04-28 RX ORDER — LEVOTHYROXINE SODIUM 75 UG/1
TABLET ORAL
Qty: 90 TABLET | Refills: 1 | Status: SHIPPED | OUTPATIENT
Start: 2022-04-28 | End: 2022-10-24

## 2022-04-28 RX ORDER — LOSARTAN POTASSIUM AND HYDROCHLOROTHIAZIDE 12.5; 5 MG/1; MG/1
1 TABLET ORAL DAILY
COMMUNITY
Start: 2022-04-15

## 2022-04-28 NOTE — PROGRESS NOTES
ADVISED PATIENT OF THE FOLLOWING HEALTH MAINTAINCE DUE  Health Maintenance Due   Topic Date Due    DTaP/Tdap/Td series (1 - Tdap) Never done    Colorectal Cancer Screening Combo  Never done    Shingrix Vaccine Age 50> (1 of 2) Never done    Bone Densitometry (Dexa) Screening  Never done    Medicare Yearly Exam  02/10/2022    COVID-19 Vaccine (3 - Booster for [employer] series) 03/12/2022      Chief Complaint   Patient presents with    Hypertension    Thyroid Problem    Insomnia       1. Have you been to the ER, urgent care clinic since your last visit? Hospitalized since your last visit? No    2. Have you seen or consulted any other health care providers outside of the [address] [place] [address] since your last visit? Include any DEXA scan, mammography  or colon screening. No    3. Do you have an Advance Directive on file? no    4. Do you have a DNR on file? no    Patient is accompanied by self I have received verbal consent from Jessa Sarabia to discuss any/all medical information while they are present in the room. No flowsheet data found.       [address] [place] [address]  Phone: [phone] Fax: [phone]    [facility] [id number]  Phone: [phone] Fax: [phone]

## 2022-04-28 NOTE — PROGRESS NOTES
Loco Palomo is a 73 y.o. female who was seen by synchronous (real-time) audio-video technology on 4/28/2022 for Hypertension, Thyroid Problem, and Insomnia        Assessment & Plan:   Diagnoses and all orders for this visit:    1. Medicare annual wellness visit, subsequent    2. Malignant neoplasm of breast, stage 2, unspecified laterality ([facility]  Assessment & Plan:   monitored by specialist. No acute findings meriting change in the plan      3. Hypertension, unspecified type  -     CBC WITH AUTOMATED DIFF; Future  -     METABOLIC PANEL, COMPREHENSIVE; Future  -     LIPID PANEL; Future    4. Acquired hypothyroidism  -     levothyroxine (SYNTHROID) 75 mcg tablet; Take 1 tablet daily  -     TSH 3RD GENERATION; Future    5. Arthritis    Follow-up and Dispositions    · Return in about 6 months (around 10/28/2022), or if symptoms worsen or fail to improve. Subjective:     patient was seen for a her medicare wellness and medications refill. Reports that her BP has been doing well when she goes to get it checked. Reports that she was changed to a combination pill after having a cough. This has since gone. Reports that she continues to have pain to the knee at times. No worsening but does flare at times. Take OTC to help and this does. No falls or use of assistive devices. Does not want a colonoscopy or a DEXA scan. Needs refills on her synthroid. This is controlled. Will need lab work done. Did get the COVID vaccine, but not interested in other vaccines at [facility] time. Prior to Admission medications    Medication Sig Start Date End Date Taking? Authorizing Provider   losartan-hydroCHLOROthiazide (HYZAAR) 50-12.5 mg per tablet Take 1 Tablet by mouth daily. 4/15/22  Yes Provider, Historical   levothyroxine (SYNTHROID) 75 mcg tablet Take 1 tablet daily 4/28/22  Yes Kristi Rossi NP   ACETAMINOPHEN PO Take  by mouth.    Yes Provider, Historical   diphenhydrAMINE (Sleeping) 50 mg capsule Take 25 mg by mouth every six (6) hours as needed. Yes Provider, Historical   loratadine (CLARITIN) 10 mg tablet Take 1 Tab by mouth daily as needed for Allergies. 7/24/19  Yes Chrystal San NP   ibuprofen (MOTRIN) 200 mg tablet Take 200 mg by mouth every six (6) hours as needed. Yes Provider, Historical   aspirin delayed-release (ASPIR-81) 81 mg tablet Take  by mouth daily. Yes Provider, Historical   levothyroxine (SYNTHROID) 75 mcg tablet TAKE 1 TABLET BY MOUTH BEFORE BREAKFAST 12/6/21 4/28/22  Lucia Rossi NP   lisinopril-hydroCHLOROthiazide (PRINZIDE, ZESTORETIC) 10-12.5 mg per tablet TAKE 1 TABLET BY MOUTH ONCE DAILY 9/24/21 4/28/22  Otis Queen NP   ergocalciferol (ERGOCALCIFEROL) 1,250 mcg (50,000 unit) capsule Take 1 Cap by mouth every seven (7) days. 3/8/21 4/28/22  Lucia Rossi NP   fluticasone propionate (FLONASE) 50 mcg/actuation nasal spray 1 [place] by Both Nostrils route two (2) times a day.   Patient not taking: Reported on 9/3/2021 7/24/19 4/28/22  Chrystal San NP     Patient Active Problem List   Diagnosis Code    HTN (hypertension) I10    [facility], [facility] C50.919    Lymphedema of arm I89.0    Arthritis M19.90    Right shoulder pain M25.511    S/P breast lumpectomy Z98.890    Vitamin D deficiency E55.9    Insomnia G47.00    Skin lesion of breast L98.8    Advanced care planning/counseling discussion Z71.89    Acquired hypothyroidism E03.9     Patient Active Problem List    Diagnosis Date Noted    Acquired hypothyroidism 06/05/2018    Advanced care planning/counseling discussion 12/04/2015    Skin lesion of breast 11/23/2015    Insomnia 10/20/2014    Vitamin D deficiency 05/06/2014    S/P breast lumpectomy 10/29/2013    Arthritis 12/18/2012    Right shoulder pain 12/18/2012    Lymphedema of arm 07/02/2012    Breast cancer, stage 2 ([facility] 03/02/2012    HTN (hypertension) 09/21/2011     Current Outpatient Medications   Medication Sig Dispense Refill    losartan-hydroCHLOROthiazide (HYZAAR) 50-12.5 mg per tablet Take 1 Tablet by mouth daily.  levothyroxine (SYNTHROID) 75 mcg tablet Take 1 tablet daily 90 Tablet 1    ACETAMINOPHEN PO Take  by mouth.  diphenhydrAMINE (Sleeping) 50 mg capsule Take 25 mg by mouth every six (6) hours as needed.  loratadine (CLARITIN) 10 mg tablet Take 1 Tab by mouth daily as needed for Allergies. 30 Tab 1    ibuprofen (MOTRIN) 200 mg tablet Take 200 mg by mouth every six (6) hours as needed.  aspirin delayed-release (ASPIR-81) 81 mg tablet Take  by mouth daily. 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  Heart Disease Father         MI    Diabetes Father     Diabetes Paternal Aunt     Diabetes Paternal Aunt      Social History     Tobacco Use    Smoking status: Never Smoker    Smokeless tobacco: Never Used   Substance Use Topics    Alcohol use: Yes     Comment: rare       Review of Systems   Constitutional: Negative. Respiratory: Negative. Cardiovascular: Negative. Gastrointestinal: Negative. Musculoskeletal: Positive for joint pain. Neurological: Negative. Psychiatric/Behavioral: Negative. Objective:   No flowsheet data found.      [INSTRUCTIONS:  \"[x]\" Indicates a positive item  \"[]\" Indicates a negative item  -- DELETE ALL ITEMS NOT EXAMINED]    Constitutional: [x] Appears well-developed and well-nourished [x] No apparent distress      [] Abnormal -     Mental status: [x] Alert and awake  [x] Oriented to person/place/time [x] Able to follow commands    [] Abnormal -     Eyes:   EOM    [x]  Normal    [] Abnormal -   Sclera  [x]  Normal    [] Abnormal -          Discharge [x]  None visible   [] Abnormal -     HENT: [x] Normocephalic, atraumatic  [] Abnormal -   [x] Mouth/Throat: Mucous membranes are moist    External Ears [x] Normal  [] Abnormal -    Neck: [x] No visualized mass [] Abnormal -     Pulmonary/Chest: [x] Respiratory effort normal   [x] No visualized signs of difficulty breathing or respiratory distress        [] Abnormal -      Musculoskeletal:   [x] Normal gait with no signs of ataxia         [x] Normal range of motion of neck        [] Abnormal -     Neurological:        [x] No Facial Asymmetry (Cranial nerve 7 motor function) (limited exam due to video visit)          [x] No gaze palsy        [] Abnormal -          Skin:        [x] No significant exanthematous lesions or discoloration noted on facial skin         [] Abnormal -            Psychiatric:       [x] Normal Affect [] Abnormal -        [x] No Hallucinations    Other pertinent observable physical exam findings:-        We discussed the expected course, resolution and complications of the diagnosis(es) in detail. Medication risks, benefits, costs, interactions, and alternatives were discussed as indicated. I advised her to contact the office if her condition worsens, changes or fails to improve as anticipated. She expressed understanding with the diagnosis(es) and plan. Suly Hernandez, was evaluated through a synchronous (real-time) audio-video encounter. The patient (or guardian if applicable) is aware that this is a billable service, which includes applicable co-pays. Verbal consent to proceed has been obtained.  The visit was conducted pursuant to the emergency declaration under the [facility] Act and the [address] waiver authority and the [employer] and [facility] General Act. Patient identification was verified, and a caregiver was present when appropriate. The patient was located at home in a state where the provider was licensed to provide care.       Andie Leong NP

## 2022-08-03 ENCOUNTER — TRANSCRIBE ORDER (OUTPATIENT)
Dept: SCHEDULING | Age: 72
End: 2022-08-03

## 2022-08-03 DIAGNOSIS — Z12.31 VISIT FOR SCREENING MAMMOGRAM: Primary | ICD-10-CM

## 2022-09-27 ENCOUNTER — HOSPITAL ENCOUNTER (OUTPATIENT)
Dept: MAMMOGRAPHY | Age: 72
Discharge: HOME | End: 2022-09-27
Attending: INTERNAL MEDICINE
Payer: MEDICARE

## 2022-09-27 DIAGNOSIS — Z12.31 VISIT FOR SCREENING MAMMOGRAM: ICD-10-CM

## 2022-09-27 PROCEDURE — 77067 SCR MAMMO BI INCL CAD: CPT

## 2022-10-24 DIAGNOSIS — E03.9 ACQUIRED HYPOTHYROIDISM: ICD-10-CM

## 2022-10-24 RX ORDER — LEVOTHYROXINE SODIUM 75 UG/1
TABLET ORAL
Qty: 90 TABLET | Refills: 0 | Status: SHIPPED | OUTPATIENT
Start: 2022-10-24

## 2023-01-25 DIAGNOSIS — E03.9 ACQUIRED HYPOTHYROIDISM: ICD-10-CM

## 2023-01-25 RX ORDER — LEVOTHYROXINE SODIUM 75 UG/1
TABLET ORAL
Qty: 90 TABLET | Refills: 0 | Status: SHIPPED | OUTPATIENT
Start: 2023-01-25

## 2023-04-26 DIAGNOSIS — E03.9 ACQUIRED HYPOTHYROIDISM: ICD-10-CM

## 2023-04-26 RX ORDER — LEVOTHYROXINE SODIUM 75 UG/1
75 TABLET ORAL DAILY
Qty: 90 TABLET | Refills: 0 | Status: CANCELLED | OUTPATIENT
Start: 2023-04-26

## 2023-04-26 NOTE — TELEPHONE ENCOUNTER
----- Message from [facility] sent at 4/26/2023 12:27 PM EDT -----  Subject: Refill Request    QUESTIONS  Name of Medication? levothyroxine (SYNTHROID) 75 MCG tablet  Patient-reported dosage and instructions? 75 mcg 1 tablet daily  How many days do you have left? 2  Preferred Pharmacy? [address]  Pharmacy phone number (if available)? [phone]  Additional Information for Provider? Pt has appointment on 05/25/23 and is   requesting a temporary refill. Pt only has 2 tablet left.  ---------------------------------------------------------------------------  --------------  CALL BACK INFO  What is the best way for the office to contact you? OK to leave message on   voicemail  Preferred Call Back Phone Number? [phone]  ---------------------------------------------------------------------------  --------------  SCRIPT ANSWERS  Relationship to Patient?  Self

## 2023-04-26 NOTE — TELEPHONE ENCOUNTER
Call placed to pt and left  for call back, She has not been seen in a year, or had labs done in 2 years.  There is no upcoming scheduled appointment that is why medication refill was denied

## 2023-05-01 ENCOUNTER — TELEPHONE (OUTPATIENT)
Dept: INTERNAL MEDICINE CLINIC | Age: 73
End: 2023-05-01

## 2023-05-01 NOTE — TELEPHONE ENCOUNTER
Call placed to pt and left VM that medication was refused due to her last appt was over a year ago.  Med refill for 30 days sent to PCP

## 2023-05-01 NOTE — TELEPHONE ENCOUNTER
----- Message from Matt [id number] sent at 5/1/2023 12:26 PM EDT -----  Subject: Message to Provider    QUESTIONS  Information for Provider? Patient was calling back about a refill on   levothyroxine (SYNTHROID) 75 MCG tablet and would like a call back she   said to leave a message on the voicemail she will be at work.  ---------------------------------------------------------------------------  --------------  Baldemar GELLER  [phone]; OK to leave message on voicemail  ---------------------------------------------------------------------------  --------------  SCRIPT ANSWERS  Relationship to Patient?  Self

## 2023-05-01 NOTE — TELEPHONE ENCOUNTER
----- Message from Matt [id number] sent at 5/1/2023 12:26 PM EDT -----  Subject: Message to Provider    QUESTIONS  Information for Provider? Patient was calling back about a refill on   levothyroxine (SYNTHROID) 75 MCG tablet and would like a call back she   said to leave a message on the voicemail she will be at work.  ---------------------------------------------------------------------------  --------------  Hill Flynn [facility]  [phone]; OK to leave message on voicemail  ---------------------------------------------------------------------------  --------------  SCRIPT ANSWERS  Relationship to Patient?  Self

## 2023-05-09 ENCOUNTER — TELEPHONE (OUTPATIENT)
Age: 73
End: 2023-05-09

## 2023-05-25 ENCOUNTER — OFFICE VISIT (OUTPATIENT)
Age: 73
End: 2023-05-25
Payer: COMMERCIAL

## 2023-05-25 VITALS
TEMPERATURE: 98.4 F | DIASTOLIC BLOOD PRESSURE: 88 MMHG | RESPIRATION RATE: 12 BRPM | HEART RATE: 84 BPM | SYSTOLIC BLOOD PRESSURE: 130 MMHG | HEIGHT: 61 IN | WEIGHT: 174 LBS | OXYGEN SATURATION: 98 % | BODY MASS INDEX: 32.85 KG/M2

## 2023-05-25 DIAGNOSIS — Z00.00 MEDICARE ANNUAL WELLNESS VISIT, SUBSEQUENT: ICD-10-CM

## 2023-05-25 DIAGNOSIS — E03.9 ACQUIRED HYPOTHYROIDISM: ICD-10-CM

## 2023-05-25 DIAGNOSIS — I10 ESSENTIAL (PRIMARY) HYPERTENSION: ICD-10-CM

## 2023-05-25 DIAGNOSIS — E55.9 VITAMIN D DEFICIENCY: ICD-10-CM

## 2023-05-25 DIAGNOSIS — Z00.00 MEDICARE ANNUAL WELLNESS VISIT, SUBSEQUENT: Primary | ICD-10-CM

## 2023-05-25 PROCEDURE — G0439 PPPS, SUBSEQ VISIT: HCPCS | Performed by: INTERNAL MEDICINE

## 2023-05-25 PROCEDURE — 3075F SYST BP GE 130 - 139MM HG: CPT | Performed by: INTERNAL MEDICINE

## 2023-05-25 PROCEDURE — 3079F DIAST BP 80-89 MM HG: CPT | Performed by: INTERNAL MEDICINE

## 2023-05-25 PROCEDURE — 1123F ACP DISCUSS/DSCN MKR DOCD: CPT | Performed by: INTERNAL MEDICINE

## 2023-05-25 RX ORDER — LEVOTHYROXINE SODIUM 0.07 MG/1
75 TABLET ORAL DAILY
Qty: 90 TABLET | Refills: 1 | Status: SHIPPED | OUTPATIENT
Start: 2023-05-25

## 2023-05-25 SDOH — ECONOMIC STABILITY: FOOD INSECURITY: WITHIN THE PAST 12 MONTHS, YOU WORRIED THAT YOUR FOOD WOULD RUN OUT BEFORE YOU GOT MONEY TO BUY MORE.: NEVER TRUE

## 2023-05-25 SDOH — ECONOMIC STABILITY: FOOD INSECURITY: WITHIN THE PAST 12 MONTHS, THE FOOD YOU BOUGHT JUST DIDN'T LAST AND YOU DIDN'T HAVE MONEY TO GET MORE.: NEVER TRUE

## 2023-05-25 SDOH — ECONOMIC STABILITY: HOUSING INSECURITY
IN THE LAST 12 MONTHS, WAS THERE A TIME WHEN YOU DID NOT HAVE A STEADY PLACE TO SLEEP OR SLEPT IN A SHELTER (INCLUDING NOW)?: NO

## 2023-05-25 SDOH — ECONOMIC STABILITY: INCOME INSECURITY: HOW HARD IS IT FOR YOU TO PAY FOR THE VERY BASICS LIKE FOOD, HOUSING, MEDICAL CARE, AND HEATING?: NOT HARD AT ALL

## 2023-05-25 ASSESSMENT — ANXIETY QUESTIONNAIRES
2. NOT BEING ABLE TO STOP OR CONTROL WORRYING: 0
GAD7 TOTAL SCORE: 0
3. WORRYING TOO MUCH ABOUT DIFFERENT THINGS: 0
4. TROUBLE RELAXING: 0
1. FEELING NERVOUS, ANXIOUS, OR ON EDGE: 0
7. FEELING AFRAID AS IF SOMETHING AWFUL MIGHT HAPPEN: 0
IF YOU CHECKED OFF ANY PROBLEMS ON THIS QUESTIONNAIRE, HOW DIFFICULT HAVE THESE PROBLEMS MADE IT FOR YOU TO DO YOUR WORK, TAKE CARE OF THINGS AT HOME, OR GET ALONG WITH OTHER PEOPLE: NOT DIFFICULT AT ALL
6. BECOMING EASILY ANNOYED OR IRRITABLE: 0
5. BEING SO RESTLESS THAT IT IS HARD TO SIT STILL: 0

## 2023-05-25 ASSESSMENT — PATIENT HEALTH QUESTIONNAIRE - PHQ9
2. FEELING DOWN, DEPRESSED OR HOPELESS: 0
SUM OF ALL RESPONSES TO PHQ QUESTIONS 1-9: 0
SUM OF ALL RESPONSES TO PHQ QUESTIONS 1-9: 0
SUM OF ALL RESPONSES TO PHQ9 QUESTIONS 1 & 2: 0
SUM OF ALL RESPONSES TO PHQ QUESTIONS 1-9: 0
SUM OF ALL RESPONSES TO PHQ QUESTIONS 1-9: 0
1. LITTLE INTEREST OR PLEASURE IN DOING THINGS: 0

## 2023-05-25 ASSESSMENT — LIFESTYLE VARIABLES
HOW MANY STANDARD DRINKS CONTAINING ALCOHOL DO YOU HAVE ON A TYPICAL DAY: 1 OR 2
HOW OFTEN DO YOU HAVE A DRINK CONTAINING ALCOHOL: MONTHLY OR LESS

## 2023-05-25 NOTE — PROGRESS NOTES
1. \"Have you been to the ER, urgent care clinic since your last visit? Hospitalized since your last visit? \" No    2. \"Have you seen or consulted any other health care providers outside of the [address] [place] [address] since your last visit? \" No    3. For patients aged 39-70: Has the patient had a colonoscopy / FIT/ Cologuard? Recommendation: Colonoscopy every 10y or annual FIT test from 50-75 or every 3 year stool DNA based test with consideration of ongoing screening from 76-85. and Declined      If the patient is female:    4. For patients aged 41-77: Has the patient had a mammogram within the past 2 years? No    5. For patients aged 21-65: Has the patient had a pap smear?  No
advice was provided regarding recommended lifestyle changes. Patient's comorbid health conditions associated with elevated BMI were discussed, as well as the likely benefits of weight loss. Based upon patient's motivation to change her behavior, the following plan was agreed upon to work toward a weight loss goal of 10 pounds: lower carbohydrate diet and increase physical activity, as tolerated. Educational materials for weight loss were provided. Patient will follow-up in 1 year(s) with PCP. Provider spent 10 minutes counseling patient. LDCT Screening: Discussed with patient the benefits and harms of screening, follow-up diagnostic testing, over-diagnosis, false positive rate, and total radiation exposure. Counseled on the importance of adherence to annual lung cancer LDCT screening, impact of comorbidities, ability and willingness to undergo diagnosis and treatment. Counseled on the importance of maintaining cigarette smoking abstinence and cessation. The patient has a history of >20 pack years and is either still smoking or quit within the last 15 years. There are no signs or symptoms of lung cancer. Objective   Vitals:    05/25/23 1428 05/25/23 1445   BP: (!) 144/96 130/88   Site: Right Upper Arm    Position: Sitting    Cuff Size: Medium Adult    Pulse: 84    Resp: 12    Temp: 98.4 °F (36.9 °C)    TempSrc: Oral    SpO2: 98%    Weight: 174 lb (78.9 kg)    Height: 5' 1\" (1.549 m)       Body mass index is 32.88 kg/m².       General Appearance: alert and oriented to person, place and time, well-developed and well-nourished, in no acute distress  Pulmonary/Chest: clear to auscultation bilaterally- no wheezes, rales or rhonchi, normal air movement, no respiratory distress  Cardiovascular: normal rate, normal S1 and S2, and no carotid bruits  Abdomen: soft, non-tender, non-distended, normal bowel sounds, no masses or organomegaly  Extremities: no cyanosis and no clubbing  Musculoskeletal:

## 2023-05-26 ENCOUNTER — TELEPHONE (OUTPATIENT)
Age: 73
End: 2023-05-26

## 2023-05-26 LAB
25(OH)D3+25(OH)D2 SERPL-MCNC: 16.9 NG/ML (ref 30–100)
ALBUMIN SERPL-MCNC: 4.3 G/DL (ref 3.8–4.8)
ALBUMIN/GLOB SERPL: 1.7 {RATIO} (ref 1.2–2.2)
ALP SERPL-CCNC: 100 IU/L (ref 44–121)
ALT SERPL-CCNC: 8 IU/L (ref 0–32)
AST SERPL-CCNC: 14 IU/L (ref 0–40)
BASOPHILS # BLD AUTO: 0.1 X10E3/UL (ref 0–0.2)
BASOPHILS NFR BLD AUTO: 2 %
BILIRUB SERPL-MCNC: 0.4 MG/DL (ref 0–1.2)
BUN SERPL-MCNC: 24 MG/DL (ref 8–27)
BUN/CREAT SERPL: 14 (ref 12–28)
CALCIUM SERPL-MCNC: 9.1 MG/DL (ref 8.7–10.3)
CHLORIDE SERPL-SCNC: 102 MMOL/L (ref 96–106)
CHOLEST SERPL-MCNC: 180 MG/DL (ref 100–199)
CO2 SERPL-SCNC: 24 MMOL/L (ref 20–29)
CREAT SERPL-MCNC: 1.74 MG/DL (ref 0.57–1)
EGFRCR SERPLBLD CKD-EPI 2021: 31 ML/MIN/1.73
EOSINOPHIL # BLD AUTO: 0.2 X10E3/UL (ref 0–0.4)
EOSINOPHIL NFR BLD AUTO: 4 %
ERYTHROCYTE [DISTWIDTH] IN BLOOD BY AUTOMATED COUNT: 12.9 % (ref 11.7–15.4)
GLOBULIN SER CALC-MCNC: 2.6 G/DL (ref 1.5–4.5)
GLUCOSE SERPL-MCNC: 97 MG/DL (ref 70–99)
HCT VFR BLD AUTO: 35 % (ref 34–46.6)
HDLC SERPL-MCNC: 59 MG/DL
HGB BLD-MCNC: 11.4 G/DL (ref 11.1–15.9)
IMM GRANULOCYTES # BLD AUTO: 0 X10E3/UL (ref 0–0.1)
IMM GRANULOCYTES NFR BLD AUTO: 0 %
IMP & REVIEW OF LAB RESULTS: NORMAL
LDLC SERPL CALC-MCNC: 106 MG/DL (ref 0–99)
LYMPHOCYTES # BLD AUTO: 1.1 X10E3/UL (ref 0.7–3.1)
LYMPHOCYTES NFR BLD AUTO: 25 %
MCH RBC QN AUTO: 27.5 PG (ref 26.6–33)
MCHC RBC AUTO-ENTMCNC: 32.6 G/DL (ref 31.5–35.7)
MCV RBC AUTO: 84 FL (ref 79–97)
MONOCYTES # BLD AUTO: 0.3 X10E3/UL (ref 0.1–0.9)
MONOCYTES NFR BLD AUTO: 7 %
NEUTROPHILS # BLD AUTO: 2.8 X10E3/UL (ref 1.4–7)
NEUTROPHILS NFR BLD AUTO: 62 %
PLATELET # BLD AUTO: 292 X10E3/UL (ref 150–450)
POTASSIUM SERPL-SCNC: 4.5 MMOL/L (ref 3.5–5.2)
PROT SERPL-MCNC: 6.9 G/DL (ref 6–8.5)
RBC # BLD AUTO: 4.15 X10E6/UL (ref 3.77–5.28)
REPORT: NORMAL
SODIUM SERPL-SCNC: 141 MMOL/L (ref 134–144)
TRIGL SERPL-MCNC: 84 MG/DL (ref 0–149)
TSH SERPL DL<=0.005 MIU/L-ACNC: 5.42 UIU/ML (ref 0.45–4.5)
VLDLC SERPL CALC-MCNC: 15 MG/DL (ref 5–40)
WBC # BLD AUTO: 4.5 X10E3/UL (ref 3.4–10.8)

## 2023-05-31 DIAGNOSIS — E55.9 VITAMIN D DEFICIENCY: Primary | ICD-10-CM

## 2023-05-31 DIAGNOSIS — E03.9 ACQUIRED HYPOTHYROIDISM: ICD-10-CM

## 2023-05-31 RX ORDER — ERGOCALCIFEROL 1.25 MG/1
50000 CAPSULE ORAL WEEKLY
Qty: 12 CAPSULE | Refills: 1 | Status: SHIPPED | OUTPATIENT
Start: 2023-05-31

## 2023-07-12 DIAGNOSIS — E03.9 ACQUIRED HYPOTHYROIDISM: ICD-10-CM

## 2023-07-14 ENCOUNTER — TELEPHONE (OUTPATIENT)
Age: 73
End: 2023-07-14

## 2023-07-14 LAB — TSH SERPL DL<=0.005 MIU/L-ACNC: 0.42 UIU/ML (ref 0.45–4.5)

## 2023-07-14 NOTE — TELEPHONE ENCOUNTER
Spoke with patient regarding labs, she said she is taking the Thyroid medication every day on an empty stomach.

## 2023-07-14 NOTE — TELEPHONE ENCOUNTER
----- Message from [facility], CALLI - NP sent at 7/14/2023  8:20 AM EDT -----  Please be sure patient is taking synthroid as directed. She was high now she is low. [Perimenopausal] : perimenopausal no concerns

## 2023-12-11 DIAGNOSIS — E03.9 ACQUIRED HYPOTHYROIDISM: ICD-10-CM

## 2023-12-11 RX ORDER — LEVOTHYROXINE SODIUM 0.07 MG/1
75 TABLET ORAL DAILY
Qty: 90 TABLET | Refills: 0 | Status: SHIPPED | OUTPATIENT
Start: 2023-12-11

## 2024-04-23 ENCOUNTER — TELEPHONE (OUTPATIENT)
Age: 74
End: 2024-04-23

## 2024-04-23 NOTE — TELEPHONE ENCOUNTER
[id number] - [place], [place] - [address] - P [phone] - F [phone]     levothyroxine (SYNTHROID) 75 MCG tablet     05/25/2023  No upcoming